# Patient Record
Sex: FEMALE | Race: OTHER | HISPANIC OR LATINO | URBAN - METROPOLITAN AREA
[De-identification: names, ages, dates, MRNs, and addresses within clinical notes are randomized per-mention and may not be internally consistent; named-entity substitution may affect disease eponyms.]

---

## 2023-11-18 ENCOUNTER — EMERGENCY (EMERGENCY)
Facility: HOSPITAL | Age: 33
LOS: 0 days | Discharge: HOME | End: 2023-11-18
Attending: EMERGENCY MEDICINE
Payer: MEDICAID

## 2023-11-18 VITALS
DIASTOLIC BLOOD PRESSURE: 71 MMHG | OXYGEN SATURATION: 98 % | HEIGHT: 61 IN | HEART RATE: 120 BPM | WEIGHT: 137.35 LBS | SYSTOLIC BLOOD PRESSURE: 140 MMHG | RESPIRATION RATE: 18 BRPM | TEMPERATURE: 98 F

## 2023-11-18 VITALS
DIASTOLIC BLOOD PRESSURE: 74 MMHG | OXYGEN SATURATION: 100 % | RESPIRATION RATE: 16 BRPM | TEMPERATURE: 98 F | HEART RATE: 110 BPM | SYSTOLIC BLOOD PRESSURE: 117 MMHG

## 2023-11-18 DIAGNOSIS — N93.9 ABNORMAL UTERINE AND VAGINAL BLEEDING, UNSPECIFIED: ICD-10-CM

## 2023-11-18 DIAGNOSIS — Z87.891 PERSONAL HISTORY OF NICOTINE DEPENDENCE: ICD-10-CM

## 2023-11-18 DIAGNOSIS — N39.0 URINARY TRACT INFECTION, SITE NOT SPECIFIED: ICD-10-CM

## 2023-11-18 LAB
APPEARANCE UR: ABNORMAL
APPEARANCE UR: ABNORMAL
BACTERIA # UR AUTO: ABNORMAL /HPF
BACTERIA # UR AUTO: ABNORMAL /HPF
BILIRUB UR-MCNC: NEGATIVE — SIGNIFICANT CHANGE UP
BILIRUB UR-MCNC: NEGATIVE — SIGNIFICANT CHANGE UP
CAST: 1 /LPF — SIGNIFICANT CHANGE UP (ref 0–4)
CAST: 1 /LPF — SIGNIFICANT CHANGE UP (ref 0–4)
COLOR SPEC: ABNORMAL
COLOR SPEC: ABNORMAL
DIFF PNL FLD: ABNORMAL
DIFF PNL FLD: ABNORMAL
GLUCOSE UR QL: NEGATIVE MG/DL — SIGNIFICANT CHANGE UP
GLUCOSE UR QL: NEGATIVE MG/DL — SIGNIFICANT CHANGE UP
KETONES UR-MCNC: NEGATIVE MG/DL — SIGNIFICANT CHANGE UP
KETONES UR-MCNC: NEGATIVE MG/DL — SIGNIFICANT CHANGE UP
LEUKOCYTE ESTERASE UR-ACNC: ABNORMAL
LEUKOCYTE ESTERASE UR-ACNC: ABNORMAL
NITRITE UR-MCNC: NEGATIVE — SIGNIFICANT CHANGE UP
NITRITE UR-MCNC: NEGATIVE — SIGNIFICANT CHANGE UP
PH UR: 5.5 — SIGNIFICANT CHANGE UP (ref 5–8)
PH UR: 5.5 — SIGNIFICANT CHANGE UP (ref 5–8)
PROT UR-MCNC: 300 MG/DL
PROT UR-MCNC: 300 MG/DL
RBC CASTS # UR COMP ASSIST: >1900 /HPF — HIGH (ref 0–4)
RBC CASTS # UR COMP ASSIST: >1900 /HPF — HIGH (ref 0–4)
SP GR SPEC: 1.02 — SIGNIFICANT CHANGE UP (ref 1–1.03)
SP GR SPEC: 1.02 — SIGNIFICANT CHANGE UP (ref 1–1.03)
SQUAMOUS # UR AUTO: 4 /HPF — SIGNIFICANT CHANGE UP (ref 0–5)
SQUAMOUS # UR AUTO: 4 /HPF — SIGNIFICANT CHANGE UP (ref 0–5)
UROBILINOGEN FLD QL: 1 MG/DL — SIGNIFICANT CHANGE UP (ref 0.2–1)
UROBILINOGEN FLD QL: 1 MG/DL — SIGNIFICANT CHANGE UP (ref 0.2–1)
WBC UR QL: >998 /HPF — HIGH (ref 0–5)
WBC UR QL: >998 /HPF — HIGH (ref 0–5)

## 2023-11-18 PROCEDURE — 99284 EMERGENCY DEPT VISIT MOD MDM: CPT

## 2023-11-18 PROCEDURE — 87077 CULTURE AEROBIC IDENTIFY: CPT

## 2023-11-18 PROCEDURE — 81001 URINALYSIS AUTO W/SCOPE: CPT

## 2023-11-18 PROCEDURE — 99283 EMERGENCY DEPT VISIT LOW MDM: CPT

## 2023-11-18 PROCEDURE — 87186 SC STD MICRODIL/AGAR DIL: CPT

## 2023-11-18 PROCEDURE — 87086 URINE CULTURE/COLONY COUNT: CPT

## 2023-11-18 RX ORDER — ACETAMINOPHEN 500 MG
1000 TABLET ORAL ONCE
Refills: 0 | Status: COMPLETED | OUTPATIENT
Start: 2023-11-18 | End: 2023-11-18

## 2023-11-18 RX ORDER — CEPHALEXIN 500 MG
500 CAPSULE ORAL ONCE
Refills: 0 | Status: COMPLETED | OUTPATIENT
Start: 2023-11-18 | End: 2023-11-18

## 2023-11-18 RX ORDER — FLUCONAZOLE 150 MG/1
150 TABLET ORAL ONCE
Refills: 0 | Status: DISCONTINUED | OUTPATIENT
Start: 2023-11-18 | End: 2023-11-18

## 2023-11-18 RX ORDER — SODIUM CHLORIDE 9 MG/ML
1000 INJECTION INTRAMUSCULAR; INTRAVENOUS; SUBCUTANEOUS ONCE
Refills: 0 | Status: DISCONTINUED | OUTPATIENT
Start: 2023-11-18 | End: 2023-11-18

## 2023-11-18 RX ORDER — PHENAZOPYRIDINE HCL 100 MG
200 TABLET ORAL ONCE
Refills: 0 | Status: COMPLETED | OUTPATIENT
Start: 2023-11-18 | End: 2023-11-18

## 2023-11-18 RX ORDER — PHENAZOPYRIDINE HCL 100 MG
1 TABLET ORAL
Qty: 6 | Refills: 0
Start: 2023-11-18 | End: 2023-11-19

## 2023-11-18 RX ORDER — CEPHALEXIN 500 MG
1 CAPSULE ORAL
Qty: 15 | Refills: 0
Start: 2023-11-18 | End: 2023-11-22

## 2023-11-18 RX ADMIN — Medication 200 MILLIGRAM(S): at 09:52

## 2023-11-18 RX ADMIN — Medication 1000 MILLIGRAM(S): at 07:57

## 2023-11-18 RX ADMIN — Medication 500 MILLIGRAM(S): at 09:52

## 2023-11-18 NOTE — ED PROVIDER NOTE - CLINICAL SUMMARY MEDICAL DECISION MAKING FREE TEXT BOX
33-year-old patient presents emergency department with pain with urination.  Patient with picture showing small amount of blood in toliet bowl/.  Patient's LMP was October 31.  Plan check urine.

## 2023-11-18 NOTE — ED PROVIDER NOTE - PROGRESS NOTE DETAILS
- [id number].  Patient updated on results for test.  Patient also expressed that she has had some white discharge and vaginal itching.  We will start patient on Keflex and will give a dose of Diflucan and will give Pyridium for pain patient.

## 2023-11-18 NOTE — ED PROVIDER NOTE - NSFOLLOWUPINSTRUCTIONS_ED_ALL_ED_FT
Please call and follow up with your doctor in 1-3 days.    Use Tylenol (acetaminophen) 1000mg every 6 hours and Motrin (Ibuprofen) 600 mg every 6 hours as need for pain.      Take Keflex 500 mg every 8 hours for the next 5 days.     use Pyridium 200 mg every 8 hours as needed for pain with urination for the next 2 days..  The    Return to the Emergency Department for worsening or persistent symptoms, and/or ANY NEW OR CONCERNING SYMPTOMS. If you have issues obtaining follow up, please call: [phone] or [phone]  to obtain a doctor or specialist who takes your insurance in your area.    Llame y marlyn un seguimiento con lloyd médico en 1 a 3 días.    Use Tylenol (acetaminofeno) 1000 mg cada 6 horas y Motrin (ibuprofeno) 600 mg cada 6 horas según sea necesario para el dolor.      [place] Keflex 500 mg cada 8 horas johana los próximos 5 días.     use Pyridium 200 mg cada 8 horas según sea necesario para el dolor al orinar johana los próximos 2 días.    Regrese al Departamento de Emergencias si los síntomas empeoran o persisten, y/o CUALQUIER SÍNTOMA NUEVO O PREOCUPANTE. Si tiene problemas para obtener un seguimiento, llame al: [phone] o al [phone] para obtener un médico o especialista que acepte lloyd seguro en lloyd área.      Infección del tracto urinario en mujeres    LO QUE NECESITA SABER:    Juwan infección en el tracto urinario (ITU) ocurre cuando entran bacterias en lloyd tracto urinario. Lloyd tracto urinario incluye rani riñones, uréteres, vejiga y uretra. Juwan infección del tracto urinario es más común in la parte inferior de lloyd tracto urinario que incluye lloyd vejiga y uretra.  Aparato urinario femenino    INSTRUCCIONES SOBRE EL [facility]:    Busque atención médica de inmediato si:    Usted está orinando muy poco o nada en absoluto.    Usted tiene fiebre chester con temblor y escalofríos.    Usted tiene dolor en el costado o en la espalda que empeora.  Llame a lloyd médico si:    Tiene fiebre.    Usted no siente mejoría después de 2 días de sierra los antibióticos.    Usted tiene síntomas nuevos, tales ashanti cliff o pus en la orina.    Usted está vomitando.    Usted tiene preguntas o inquietudes acerca de lloyd condición o cuidado.  Medicamentos:    Los antibióticostratan las infecciones bacterianas. Si tiene infecciones urinarias con frecuencia (llamadas recurrentes), se le pueden chilo antibióticos para que los tome regularmente. Le enseñarán cuándo y cómo usar los antibióticos. El objetivo es prevenir las infecciones urinarias, venancio no causar resistencia a los antibióticos mediante el uso de antibióticos con demasiada frecuencia.    Los medicamentospara disminuir el dolor y el ardor al orinar. También ayudarán a disminuir la sensación de necesitar orinar con frecuencia. Estos medicamentos podrían hacer que orine de color anaranjado o coombs.    [place] rani medicamentos ashanti se le haya indicado.Consulte con lloyd médico si usted richard que lloyd medicamento no le está ayudando o si presenta efectos secundarios. Infórmele al médico si usted es alérgico a algún medicamento. Mantenga juwan lista actualizada de los medicamentos, las vitaminas y los productos herbales que lauro. Incluya los siguientes datos de los medicamentos: cantidad, frecuencia y motivo de administración. Traiga con usted la lista o los envases de las píldoras a rani citas de seguimiento. Lleve la lista de los medicamentos con usted en lalit de juwan emergencia.  Acuda a la consulta de control con lloyd médico según las indicaciones:Anote rani preguntas para que se acuerde de hacerlas johana rani visitas.    Evite otra ITU:    Vacíe la vejiga con frecuencia.Orine y vacíe la vejiga tan pronto ashanti usted sienta la necesidad. No retenga la orina por largos períodos de tiempo.    Límpiese de adelante hacia atrás después de orinar o de tener juwan evacuación intestinal.Nectar ayudará a evitar que los gérmenes entren en el tracto urinario a través de la uretra.    [place] líquidos ashanti se le haya indicado.Pregunte cuánto líquido debe sierra cada día y cuáles líquidos son los más adecuados para usted. Es probable que usted necesite sierra más líquidos de lo habitual para ayudar a deshacerse de la bacteria. No tome alcohol, cafeína ni jugos cítricos. Estos pueden irritar lloyd vejiga y aumentar rani síntomas. Lloyd médico puede recomendarle el jugo de arándano para prevenir juwan infección urinaria.    Orine antes y después de tener relaciones sexuales.Nectar puede ayudar a eliminar las bacterias que pasan johana el sexo.    No tome duchas vaginales ni use desodorantes femeninos.Estos pueden cambiar el equilibrio químico de la vagina.    Cambie las compresas o los tampones a menudo.Nectar ayudará a evitar que los gérmenes entren en el tracto urinario.    Consulte con lloyd médico sobre los métodos anticonceptivos.Es posible que tenga que cambiar lloyd método si está aumentando lloyd riesgo de infecciones urinarias.    Use ropa interior de algodón y ropa suelta.Los pantalones ajustados y la ropa interior de nylon pueden atrapar humedad y hacer que las bacterias crezcan.    Se puede recomendar el uso de estrógeno vaginal.Della medicamento ayuda a prevenir las infecciones urinarias en mujeres que cheema pasado por la menopausia o que están en la perimenopausia.    Realice ejercicios para los músculos pélvicos con frecuencia.Los ejercicios para los músculos pélvicos ayudan a empezar y parar de orinar. Los músculos pélvicos teresa ayudan a vaciar la vejiga más fácilmente. Apriete estos músculos firmemente johana 5 segundos ashanti si estuviera tratando de retener el flujo de orina. Luego relaje por 5 segundos. Aumente gradualmente a 10 segundos. Marlyn 3 series de 15 repeticiones al día o ashanti se le indique.

## 2023-11-18 NOTE — ED ADULT TRIAGE NOTE - CHIEF COMPLAINT QUOTE
pt complaining of vaginal pain x1 day with bleeding starting today.  pt denies pregnancy or this being her period.  no medications PTA.  pt has urinary frequency with minimal output with burning with urination.

## 2023-11-18 NOTE — ED PROVIDER NOTE - CHIEF COMPLAINT
The patient is a 33y Female complaining of vaginal bleeding.
preop diagnosis colon cancer hepatic flexure s/p partial colectomy

## 2023-11-18 NOTE — ED PROVIDER NOTE - PATIENT PORTAL LINK FT
You can access the FollowMyHealth Patient Portal offered by [facility] by registering at the following website: [email or web address]. By joining [facility]’s FollowMyHealth portal, you will also be able to view your health information using other applications (apps) compatible with our system.

## 2023-11-18 NOTE — ED ADULT NURSE NOTE - OBJECTIVE STATEMENT
Patient presents to ED c/o pink tinged urine x 1 day and abdominal pain. Denies fever/chills. Pt endorses urinary frequency with small amounts of urine and dysuria. Denies being pregnant.

## 2023-11-18 NOTE — ED PROVIDER NOTE - NSDCPRINTRESULTS_ED_ALL_ED
<<----- Click to add NO significant Past Surgical History
Patient requests all Lab, Cardiology, and Radiology Results on their Discharge Instructions

## 2023-11-18 NOTE — ED PROVIDER NOTE - OBJECTIVE STATEMENT
Azeri - [id number]  used   33-year-old patient lives smoker history presents emergency department with pain.  Patient stated symptoms started yesterday and this morning the pain was worse and came to the hospital.  Denies any fever.  Did notice some blood in her urine this morning.  No travel, no sick contacts, no new sexual partners.  No history of STIs.   patient has had the symptoms before however did not seek medical attention.

## 2023-11-21 LAB
-  AMOXICILLIN/CLAVULANIC ACID: SIGNIFICANT CHANGE UP
-  AMOXICILLIN/CLAVULANIC ACID: SIGNIFICANT CHANGE UP
-  AMPICILLIN/SULBACTAM: SIGNIFICANT CHANGE UP
-  AMPICILLIN/SULBACTAM: SIGNIFICANT CHANGE UP
-  AMPICILLIN: SIGNIFICANT CHANGE UP
-  AMPICILLIN: SIGNIFICANT CHANGE UP
-  AZTREONAM: SIGNIFICANT CHANGE UP
-  AZTREONAM: SIGNIFICANT CHANGE UP
-  CEFAZOLIN: SIGNIFICANT CHANGE UP
-  CEFAZOLIN: SIGNIFICANT CHANGE UP
-  CEFEPIME: SIGNIFICANT CHANGE UP
-  CEFEPIME: SIGNIFICANT CHANGE UP
-  CEFOTAXIME: SIGNIFICANT CHANGE UP
-  CEFOTAXIME: SIGNIFICANT CHANGE UP
-  CEFOXITIN: SIGNIFICANT CHANGE UP
-  CEFOXITIN: SIGNIFICANT CHANGE UP
-  CEFTAZIDIME: SIGNIFICANT CHANGE UP
-  CEFTAZIDIME: SIGNIFICANT CHANGE UP
-  CEFTRIAXONE: SIGNIFICANT CHANGE UP
-  CEFTRIAXONE: SIGNIFICANT CHANGE UP
-  CIPROFLOXACIN: SIGNIFICANT CHANGE UP
-  CIPROFLOXACIN: SIGNIFICANT CHANGE UP
-  ERTAPENEM: SIGNIFICANT CHANGE UP
-  ERTAPENEM: SIGNIFICANT CHANGE UP
-  GENTAMICIN: SIGNIFICANT CHANGE UP
-  GENTAMICIN: SIGNIFICANT CHANGE UP
-  IMIPENEM: SIGNIFICANT CHANGE UP
-  IMIPENEM: SIGNIFICANT CHANGE UP
-  LEVOFLOXACIN: SIGNIFICANT CHANGE UP
-  LEVOFLOXACIN: SIGNIFICANT CHANGE UP
-  MEROPENEM: SIGNIFICANT CHANGE UP
-  MEROPENEM: SIGNIFICANT CHANGE UP
-  NITROFURANTOIN: SIGNIFICANT CHANGE UP
-  NITROFURANTOIN: SIGNIFICANT CHANGE UP
-  PIPERACILLIN/TAZOBACTAM: SIGNIFICANT CHANGE UP
-  PIPERACILLIN/TAZOBACTAM: SIGNIFICANT CHANGE UP
-  TOBRAMYCIN: SIGNIFICANT CHANGE UP
-  TOBRAMYCIN: SIGNIFICANT CHANGE UP
-  TRIMETHOPRIM/SULFAMETHOXAZOLE: SIGNIFICANT CHANGE UP
-  TRIMETHOPRIM/SULFAMETHOXAZOLE: SIGNIFICANT CHANGE UP
CULTURE RESULTS: ABNORMAL
CULTURE RESULTS: ABNORMAL
METHOD TYPE: SIGNIFICANT CHANGE UP
METHOD TYPE: SIGNIFICANT CHANGE UP
ORGANISM # SPEC MICROSCOPIC CNT: ABNORMAL
ORGANISM # SPEC MICROSCOPIC CNT: ABNORMAL
ORGANISM # SPEC MICROSCOPIC CNT: SIGNIFICANT CHANGE UP
ORGANISM # SPEC MICROSCOPIC CNT: SIGNIFICANT CHANGE UP
SPECIMEN SOURCE: SIGNIFICANT CHANGE UP
SPECIMEN SOURCE: SIGNIFICANT CHANGE UP

## 2025-07-04 ENCOUNTER — INPATIENT (INPATIENT)
Facility: HOSPITAL | Age: 35
LOS: 2 days | Discharge: HOME | DRG: 392 | End: 2025-07-07
Attending: INTERNAL MEDICINE | Admitting: INTERNAL MEDICINE
Payer: SELF-PAY

## 2025-07-04 VITALS
RESPIRATION RATE: 18 BRPM | SYSTOLIC BLOOD PRESSURE: 115 MMHG | TEMPERATURE: 99 F | HEIGHT: 62 IN | WEIGHT: 127.87 LBS | DIASTOLIC BLOOD PRESSURE: 78 MMHG | OXYGEN SATURATION: 99 % | HEART RATE: 105 BPM

## 2025-07-04 DIAGNOSIS — E87.6 HYPOKALEMIA: ICD-10-CM

## 2025-07-04 DIAGNOSIS — Z29.9 ENCOUNTER FOR PROPHYLACTIC MEASURES, UNSPECIFIED: ICD-10-CM

## 2025-07-04 DIAGNOSIS — D50.9 IRON DEFICIENCY ANEMIA, UNSPECIFIED: ICD-10-CM

## 2025-07-04 DIAGNOSIS — D75.839 THROMBOCYTOSIS, UNSPECIFIED: ICD-10-CM

## 2025-07-04 DIAGNOSIS — K52.9 NONINFECTIVE GASTROENTERITIS AND COLITIS, UNSPECIFIED: ICD-10-CM

## 2025-07-04 DIAGNOSIS — R74.8 ABNORMAL LEVELS OF OTHER SERUM ENZYMES: ICD-10-CM

## 2025-07-04 DIAGNOSIS — A41.9 SEPSIS, UNSPECIFIED ORGANISM: ICD-10-CM

## 2025-07-04 LAB
ALBUMIN SERPL ELPH-MCNC: 3.2 G/DL — LOW (ref 3.3–5)
ALP SERPL-CCNC: 137 U/L — HIGH (ref 30–120)
ALT FLD-CCNC: 35 U/L — SIGNIFICANT CHANGE UP (ref 10–60)
ANION GAP SERPL CALC-SCNC: 12 MMOL/L — SIGNIFICANT CHANGE UP (ref 5–17)
APPEARANCE UR: CLEAR — SIGNIFICANT CHANGE UP
APTT BLD: 24.1 SEC — LOW (ref 26.1–36.8)
AST SERPL-CCNC: 29 U/L — SIGNIFICANT CHANGE UP (ref 10–40)
BASOPHILS # BLD AUTO: 0.06 K/UL — SIGNIFICANT CHANGE UP (ref 0–0.2)
BASOPHILS # BLD MANUAL: 0 K/UL — SIGNIFICANT CHANGE UP (ref 0–0.2)
BASOPHILS NFR BLD AUTO: 0.4 % — SIGNIFICANT CHANGE UP (ref 0–2)
BASOPHILS NFR BLD MANUAL: 0 % — SIGNIFICANT CHANGE UP (ref 0–2)
BILIRUB SERPL-MCNC: 0.2 MG/DL — SIGNIFICANT CHANGE UP (ref 0.2–1.2)
BILIRUB UR-MCNC: NEGATIVE — SIGNIFICANT CHANGE UP
BUN SERPL-MCNC: 10 MG/DL — SIGNIFICANT CHANGE UP (ref 7–23)
CALCIUM SERPL-MCNC: 9 MG/DL — SIGNIFICANT CHANGE UP (ref 8.4–10.5)
CHLORIDE SERPL-SCNC: 100 MMOL/L — SIGNIFICANT CHANGE UP (ref 96–108)
CO2 SERPL-SCNC: 25 MMOL/L — SIGNIFICANT CHANGE UP (ref 22–31)
COLOR SPEC: ABNORMAL
CREAT SERPL-MCNC: 0.87 MG/DL — SIGNIFICANT CHANGE UP (ref 0.5–1.3)
DIFF PNL FLD: NEGATIVE — SIGNIFICANT CHANGE UP
EGFR: 88 ML/MIN/1.73M2 — SIGNIFICANT CHANGE UP
EGFR: 88 ML/MIN/1.73M2 — SIGNIFICANT CHANGE UP
EOSINOPHIL # BLD AUTO: 0.41 K/UL — SIGNIFICANT CHANGE UP (ref 0–0.5)
EOSINOPHIL # BLD MANUAL: 0.26 K/UL — SIGNIFICANT CHANGE UP (ref 0–0.5)
EOSINOPHIL NFR BLD AUTO: 2.6 % — SIGNIFICANT CHANGE UP (ref 0–6)
EOSINOPHIL NFR BLD MANUAL: 1.7 % — SIGNIFICANT CHANGE UP (ref 0–6)
GIANT PLATELETS BLD QL SMEAR: PRESENT
GLUCOSE SERPL-MCNC: 129 MG/DL — HIGH (ref 70–99)
GLUCOSE UR QL: NEGATIVE MG/DL — SIGNIFICANT CHANGE UP
HCG UR QL: NEGATIVE — SIGNIFICANT CHANGE UP
HCT VFR BLD CALC: 27.2 % — LOW (ref 34.5–45)
HGB BLD-MCNC: 8.1 G/DL — LOW (ref 11.5–15.5)
IMM GRANULOCYTES # BLD AUTO: 0.06 K/UL — SIGNIFICANT CHANGE UP (ref 0–0.07)
IMM GRANULOCYTES NFR BLD AUTO: 0.4 % — SIGNIFICANT CHANGE UP (ref 0–0.9)
INR BLD: 1.17 RATIO — HIGH (ref 0.85–1.16)
KETONES UR QL: NEGATIVE MG/DL — SIGNIFICANT CHANGE UP
LEUKOCYTE ESTERASE UR-ACNC: ABNORMAL
LIDOCAIN IGE QN: 197 U/L — HIGH (ref 16–77)
LYMPHOCYTES # BLD AUTO: 3.19 K/UL — SIGNIFICANT CHANGE UP (ref 1–3.3)
LYMPHOCYTES # BLD MANUAL: 3.66 K/UL — HIGH (ref 1–3.3)
LYMPHOCYTES NFR BLD AUTO: 20.5 % — SIGNIFICANT CHANGE UP (ref 13–44)
LYMPHOCYTES NFR BLD MANUAL: 23.5 % — SIGNIFICANT CHANGE UP (ref 13–44)
MAGNESIUM SERPL-MCNC: 2.4 MG/DL — SIGNIFICANT CHANGE UP (ref 1.6–2.6)
MANUAL NEUTROPHIL BANDS #: 0.14 K/UL — SIGNIFICANT CHANGE UP (ref 0–0.84)
MANUAL REACTIVE LYMPHOCYTES #: 0.14 K/UL — SIGNIFICANT CHANGE UP (ref 0–0.63)
MCHC RBC-ENTMCNC: 19 PG — LOW (ref 27–34)
MCHC RBC-ENTMCNC: 29.8 G/DL — LOW (ref 32–36)
MCV RBC AUTO: 63.8 FL — LOW (ref 80–100)
MONOCYTES # BLD AUTO: 0.87 K/UL — SIGNIFICANT CHANGE UP (ref 0–0.9)
MONOCYTES # BLD MANUAL: 0.14 K/UL — SIGNIFICANT CHANGE UP (ref 0–0.9)
MONOCYTES NFR BLD AUTO: 5.6 % — SIGNIFICANT CHANGE UP (ref 2–14)
MONOCYTES NFR BLD MANUAL: 0.9 % — LOW (ref 2–14)
NEUTROPHILS # BLD AUTO: 10.99 K/UL — HIGH (ref 1.8–7.4)
NEUTROPHILS # BLD MANUAL: 11.23 K/UL — HIGH (ref 1.8–7.4)
NEUTROPHILS NFR BLD AUTO: 70.5 % — SIGNIFICANT CHANGE UP (ref 43–77)
NEUTROPHILS NFR BLD MANUAL: 72.1 % — SIGNIFICANT CHANGE UP (ref 43–77)
NEUTS BAND # BLD: 0.9 % — SIGNIFICANT CHANGE UP (ref 0–8)
NEUTS BAND NFR BLD: 0.9 % — SIGNIFICANT CHANGE UP (ref 0–8)
NITRITE UR-MCNC: POSITIVE
NRBC # BLD AUTO: 0 K/UL — SIGNIFICANT CHANGE UP (ref 0–0)
NRBC # FLD: 0 K/UL — SIGNIFICANT CHANGE UP (ref 0–0)
NRBC BLD AUTO-RTO: 0 /100 WBCS — SIGNIFICANT CHANGE UP (ref 0–0)
OB PNL STL: NEGATIVE — SIGNIFICANT CHANGE UP
PH UR: 5 — SIGNIFICANT CHANGE UP (ref 5–8)
PLAT MORPH BLD: NORMAL — SIGNIFICANT CHANGE UP
PLATELET # BLD AUTO: 432 K/UL — HIGH (ref 150–400)
PLATELET COUNT - ESTIMATE: NORMAL — SIGNIFICANT CHANGE UP
PMV BLD: 8.8 FL — SIGNIFICANT CHANGE UP (ref 7–13)
POTASSIUM SERPL-MCNC: 3.2 MMOL/L — LOW (ref 3.5–5.3)
POTASSIUM SERPL-SCNC: 3.2 MMOL/L — LOW (ref 3.5–5.3)
PROT SERPL-MCNC: 8.1 G/DL — SIGNIFICANT CHANGE UP (ref 6–8.3)
PROT UR-MCNC: SIGNIFICANT CHANGE UP MG/DL
PROTHROM AB SERPL-ACNC: 13.5 SEC — HIGH (ref 9.9–13.4)
RBC # BLD: 4.26 M/UL — SIGNIFICANT CHANGE UP (ref 3.8–5.2)
RBC # FLD: 19.2 % — HIGH (ref 10.3–14.5)
RBC BLD AUTO: NORMAL — SIGNIFICANT CHANGE UP
SMUDGE CELLS # BLD: PRESENT
SODIUM SERPL-SCNC: 137 MMOL/L — SIGNIFICANT CHANGE UP (ref 135–145)
SP GR SPEC: 1.02 — SIGNIFICANT CHANGE UP (ref 1–1.03)
UROBILINOGEN FLD QL: 0.2 MG/DL — SIGNIFICANT CHANGE UP (ref 0.2–1)
VARIANT LYMPHS # BLD: 0.9 % — SIGNIFICANT CHANGE UP (ref 0–6)
VARIANT LYMPHS NFR BLD MANUAL: 0.9 % — SIGNIFICANT CHANGE UP (ref 0–6)
WBC # BLD: 15.58 K/UL — HIGH (ref 3.8–10.5)
WBC # FLD AUTO: 15.58 K/UL — HIGH (ref 3.8–10.5)

## 2025-07-04 PROCEDURE — 74177 CT ABD & PELVIS W/CONTRAST: CPT

## 2025-07-04 PROCEDURE — 36415 COLL VENOUS BLD VENIPUNCTURE: CPT

## 2025-07-04 PROCEDURE — 82272 OCCULT BLD FECES 1-3 TESTS: CPT

## 2025-07-04 PROCEDURE — 86900 BLOOD TYPING SEROLOGIC ABO: CPT

## 2025-07-04 PROCEDURE — 81001 URINALYSIS AUTO W/SCOPE: CPT

## 2025-07-04 PROCEDURE — 83690 ASSAY OF LIPASE: CPT

## 2025-07-04 PROCEDURE — 86901 BLOOD TYPING SEROLOGIC RH(D): CPT

## 2025-07-04 PROCEDURE — 85730 THROMBOPLASTIN TIME PARTIAL: CPT

## 2025-07-04 PROCEDURE — 85610 PROTHROMBIN TIME: CPT

## 2025-07-04 PROCEDURE — 71045 X-RAY EXAM CHEST 1 VIEW: CPT

## 2025-07-04 PROCEDURE — 93010 ELECTROCARDIOGRAM REPORT: CPT

## 2025-07-04 PROCEDURE — 86850 RBC ANTIBODY SCREEN: CPT

## 2025-07-04 PROCEDURE — 99285 EMERGENCY DEPT VISIT HI MDM: CPT

## 2025-07-04 PROCEDURE — 81025 URINE PREGNANCY TEST: CPT

## 2025-07-04 PROCEDURE — 85025 COMPLETE CBC W/AUTO DIFF WBC: CPT

## 2025-07-04 PROCEDURE — 80053 COMPREHEN METABOLIC PANEL: CPT

## 2025-07-04 PROCEDURE — 83735 ASSAY OF MAGNESIUM: CPT

## 2025-07-04 PROCEDURE — 99223 1ST HOSP IP/OBS HIGH 75: CPT

## 2025-07-04 PROCEDURE — 71045 X-RAY EXAM CHEST 1 VIEW: CPT | Mod: 26

## 2025-07-04 PROCEDURE — 74177 CT ABD & PELVIS W/CONTRAST: CPT | Mod: 26

## 2025-07-04 RX ORDER — ACETAMINOPHEN 500 MG/5ML
1000 LIQUID (ML) ORAL ONCE
Refills: 0 | Status: COMPLETED | OUTPATIENT
Start: 2025-07-04 | End: 2025-07-04

## 2025-07-04 RX ORDER — AZITHROMYCIN 250 MG
CAPSULE ORAL
Refills: 0 | Status: DISCONTINUED | OUTPATIENT
Start: 2025-07-04 | End: 2025-07-07

## 2025-07-04 RX ORDER — SODIUM CHLORIDE 9 G/1000ML
1000 INJECTION, SOLUTION INTRAVENOUS
Refills: 0 | Status: DISCONTINUED | OUTPATIENT
Start: 2025-07-04 | End: 2025-07-06

## 2025-07-04 RX ORDER — AZITHROMYCIN 250 MG
500 CAPSULE ORAL ONCE
Refills: 0 | Status: COMPLETED | OUTPATIENT
Start: 2025-07-04 | End: 2025-07-04

## 2025-07-04 RX ORDER — PIPERACILLIN-TAZO-DEXTROSE,ISO 3.375G/5
3.38 IV SOLUTION, PIGGYBACK PREMIX FROZEN(ML) INTRAVENOUS ONCE
Refills: 0 | Status: COMPLETED | OUTPATIENT
Start: 2025-07-04 | End: 2025-07-04

## 2025-07-04 RX ORDER — ACETAMINOPHEN 500 MG/5ML
650 LIQUID (ML) ORAL EVERY 6 HOURS
Refills: 0 | Status: DISCONTINUED | OUTPATIENT
Start: 2025-07-04 | End: 2025-07-07

## 2025-07-04 RX ORDER — PREDNISONE 20 MG/1
40 TABLET ORAL AT BEDTIME
Refills: 0 | Status: DISCONTINUED | OUTPATIENT
Start: 2025-07-04 | End: 2025-07-06

## 2025-07-04 RX ORDER — AZITHROMYCIN 250 MG
500 CAPSULE ORAL EVERY 24 HOURS
Refills: 0 | Status: DISCONTINUED | OUTPATIENT
Start: 2025-07-05 | End: 2025-07-07

## 2025-07-04 RX ADMIN — Medication 3.38 GRAM(S): at 20:46

## 2025-07-04 RX ADMIN — Medication 1000 MILLIGRAM(S): at 20:15

## 2025-07-04 RX ADMIN — Medication 250 MILLIGRAM(S): at 22:57

## 2025-07-04 RX ADMIN — Medication 200 GRAM(S): at 20:16

## 2025-07-04 RX ADMIN — Medication 40 MILLIEQUIVALENT(S): at 20:50

## 2025-07-04 RX ADMIN — SODIUM CHLORIDE 150 MILLILITER(S): 9 INJECTION, SOLUTION INTRAVENOUS at 22:57

## 2025-07-04 RX ADMIN — Medication 400 MILLIGRAM(S): at 19:30

## 2025-07-04 RX ADMIN — Medication 1000 MILLILITER(S): at 19:30

## 2025-07-04 RX ADMIN — PREDNISONE 40 MILLIGRAM(S): 20 TABLET ORAL at 22:58

## 2025-07-04 RX ADMIN — Medication 1000 MILLIGRAM(S): at 19:45

## 2025-07-04 RX ADMIN — Medication 1000 MILLILITER(S): at 18:43

## 2025-07-04 RX ADMIN — Medication 20 MILLIGRAM(S): at 19:31

## 2025-07-04 NOTE — ED ADULT NURSE NOTE - OBJECTIVE STATEMENT
34 yo F no pmh ambulated to ED c/o intermittent periumbilical abdominal pain x 8 days with associated diarrhea.  The diarrhea has resolved but the pain persists.  Pt endorses having similar pain in the past, but not as extreme.  Denies CP, back pain, SOB, fevers/chills, n/v, lightheadedness, dizziness, changes in urinary habits.  A&Ox4, abdomen soft, nondistended, tender to palpation.  Skin w/d/i.

## 2025-07-04 NOTE — H&P ADULT - NSHPADDITIONALINFOADULT_GEN_ALL_CORE
Management plan was discussed with patient at the bedside using the free translation service line, she understands & agrees.

## 2025-07-04 NOTE — H&P ADULT - PROBLEM SELECTOR PLAN 1
Terminal Ileitis as suggested by CT with RLQ mesenteric LN enlargement, possibly infectious, r/o Crohn's 1st episode, admitted to medicine, tested negative for FOB, contact precautions pending GI- PCR & stool culture results, pain control, IVF hydration, I & O, received one dose of Zosyn earlier by ED team, changed to Azithromycin 500 mg IVPB daily 1st dose given stat , also started her empirically on Prednisone 40 mg at bedtime 1st dose stat, stool for Calprotectin, GI consult with Dr. Zhang was called earlier by ED team, called ID consult with Dr. Kelley.

## 2025-07-04 NOTE — ED PROVIDER NOTE - DIFFERENTIAL DIAGNOSIS
Differential Diagnosis Patient's presenting to the emergency room with diffuse abdominal pain and diarrhea.  Differential includes colitis versus diverticulitis versus additional intra-abdominal pathology.  Will obtain screening labs CT imaging hydrate medicate for symptoms begin antibiotics as needed obtain stool studies if able to do monitor.  Patient denies any recent antibiotic use.  Ultimate clinical disposition will be pending results.

## 2025-07-04 NOTE — H&P ADULT - NSHPLABSRESULTS_GEN_ALL_CORE
-                          8.1    15.58 )-----------( 432      ( 2025 18:41 )             27.2       2025 18:41    137    |  100    |  10     ----------------------------<  129    3.2     |  25     |  0.87     Ca    9.0        2025 18:41  Mg     2.4       2025 18:41    TPro  8.1    /  Alb  3.2    /  TBili  0.2    /  DBili  x      /  AST  29     /  ALT  35     /  AlkPhos  137    2025 18:41    LIVER FUNCTIONS - ( 2025 18:41 )  Alb: 3.2 g/dL / Pro: 8.1 g/dL / ALK PHOS: 137 U/L / ALT: 35 U/L / AST: 29 U/L / GGT: x           PT/INR - ( 2025 19:10 )   PT: 13.5 sec;   INR: 1.17 ratio    PTT - ( 2025 19:10 )  PTT:24.1 sec    Urinalysis Basic - ( 2025 18:41 )  Color: Red / Appearance: Clear / S.016 / pH: x  Gluc: 129 mg/dL / Ketone: x  / Bili: Negative / Urobili: 0.2 mg/dL   Blood: x / Protein: Trace mg/dL / Nitrite: Positive   Leuk Esterase: Small / RBC: 1 /HPF / WBC 4 /HPF   Sq Epi: x / Non Sq Epi: x / Bacteria: Few /HPF    Pregnancy Profile, Urine (25 @ 18:41)   Pregnancy Profile, Urine: Negative.    Occult Blood, Feces (25 @ 19:00)   Occult Blood, Feces: Negative: Method: Peroxidase Catalyzation Activity    Lipase (25 @ 18:41)   Lipase: 197 U/L      CT ABDOMEN AND PELVIS IC    PROCEDURE DATE:  2025    INTERPRETATION:  CLINICAL INFORMATION: Stomach pain. Diarrhea.  COMPARISON: None.  CONTRAST/COMPLICATIONS:  IV Contrast: 80 cc administered   20 cc discarded  Oral Contrast: None  PROCEDURE:  CT of the Abdomen and Pelvis was performed.  Sagittal and coronal reformats were performed.  FINDINGS:  LOWER CHEST: Within normal limits.  LIVER: Within normal limits.  BILE DUCTS: Normal caliber.  GALLBLADDER: Within normal limits.  SPLEEN: Within normal limits.  PANCREAS: Within normal limits.  ADRENALS: Within normal limits.  KIDNEYS/URETERS: Within normal limits.  BLADDER: Within normal limits.  REPRODUCTIVE ORGANS: Uterus and adnexa within normal limits.  BOWEL: No bowel obstruction. Appendix is normal. There is wall thickening and increased mucosal enhancement of the terminal ileum measuring about 10 cm in length.  PERITONEUM/RETROPERITONEUM: No ascites or free air..  VESSELS: Within normal limits.  LYMPH NODES: Mildly enlarged right lower quadrant mesenteric lymph nodes.   No retroperitoneal lymphadenopathy.  ABDOMINAL WALL: Within normal limits.  BONES: Within normal limits.  IMPRESSION:  Findings consistent with ileitis of infectious or inflammatory change.      XR CHEST AP OR PA 1V     PROCEDURE DATE:  2025    INTERPRETATION:  TECHNIQUE: A single AP view of the chest was obtained.   Ordered time:   2025 8:39 PM  COMPARISON: None  CLINICAL INFORMATION: Abdominal pain  FINDINGS:  The heart is not well assessed on an AP film.  The lungs are clear.  There are no pleural effusions.  There is no pneumothorax.  IMPRESSION:  No radiographic evidence of acute cardiopulmonary disease  Personally reviewed by me.      EKG:    As per my review shows SR at 85/min, normal MD & QTc intervals, normal QRS voltage, duration, and axis (+45), with normal transition, no ST-T abnormality.    -

## 2025-07-04 NOTE — H&P ADULT - PROBLEM SELECTOR PLAN 5
ML iron deficiency anemia, may be related to ileal absorption of iron, no reported bleeding from anywhere, negative stool for FOB, check serum iron level, serum ferritin, and TIBC.

## 2025-07-04 NOTE — ED ADULT TRIAGE NOTE - BMI (KG/M2)
[Care Plan reviewed and provided to patient/caregiver] : Care plan reviewed and provided to patient/caregiver
23.4

## 2025-07-04 NOTE — H&P ADULT - HISTORY OF PRESENT ILLNESS
This is a 34 y/o F with no significant PMH who presented with diffuse crampy abdominal pain, mostly around the umbilicus & RLQ, started 8 days ago, and watery non bloody diarrhea that started the following day, about 5 times a day, last was yesterday am, also reports having a single episode of vomiting 2 days ago, vomitus was unremarkable, no fever or chills, no household with similar symptoms, denies any deli or undercooked/ raw meat consumption, no H/O of similar episodes in the past, and no FH of IBD.

## 2025-07-04 NOTE — H&P ADULT - ASSESSMENT
34 y/o F with no significant PMH presented with abdominal pain, diarrhea, and a single episode of vomiting.

## 2025-07-04 NOTE — ED PROVIDER NOTE - TELEPHONIC ID NUMBER OF THE INTERPRETER
For information on Fall & Injury Prevention, visit [email or web address]

## 2025-07-04 NOTE — H&P ADULT - PROBLEM SELECTOR PLAN 3
ML 2ry to GI loss, no related EKG abnormality, started her on telemetry, patient received 40 meq of potassium chloride PO X1 dose earlier, gave additional 3 doses of potassium chloride IVPB, check serum potassium level in am, also recheck serum magnesium level.

## 2025-07-04 NOTE — ED PROVIDER NOTE - OBJECTIVE STATEMENT
DISPLAY PLAN FREE TEXT 35-year-old female with no significant past medical history presents with complaint of abdominal pain x 8 days.  Patient states that she started having abdominal pain 8 days ago and had nonbloody diarrhea for the past 7 days which subsided yesterday.  States that she had 1 episode of vomiting two days ago.  LMP-6/13.  Denies history of abdominal surgeries, fever, dysuria/hematuria, blood in stool, hematemesis, vaginal bleeding, back pain, chest pain, difficulty breathing, dizziness, recent travel, sick contacts or other symptoms.

## 2025-07-04 NOTE — ED PROVIDER NOTE - CARE PLAN
1 Principal Discharge DX:	Ileitis  Secondary Diagnosis:	Anemia  Secondary Diagnosis:	Elevated lipase

## 2025-07-04 NOTE — ED ADULT TRIAGE NOTE - CHIEF COMPLAINT QUOTE
patient c/o of abdominal pain for 8 days , c/o of vomiting , c/o of diarrhea for 7 days , no blood in stool or urine, no dysuria ,

## 2025-07-04 NOTE — H&P ADULT - NSHPREVIEWOFSYSTEMS_GEN_ALL_CORE
-    CONSTITUTIONAL: No fever or chills.  EYES: No eye pain, visual disturbances, or discharge.  ENMT:  No difficulty hearing, vertigo, sinus or throat pain.  NECK: No pain or stiffness.	  RESPIRATORY: No cough, wheezing, or hemoptysis; No shortness of breath.  CARDIOVASCULAR: No chest pain, palpitations, dizziness, or leg swelling.  GASTROINTESTINAL: (+) abdominal pain, nausea, vomiting, and diarrhea, no hematemesis; no melena or hematochezia.  GENITOURINARY: No dysuria, frequency, hematuria, or incontinence.  NEUROLOGICAL: No headaches, focal muscle weakness, numbness, or tremors.  MUSCULOSKELETAL: No joint swelling or pain.  PSYCHIATRIC: No depression, anxiety, or agitation.

## 2025-07-04 NOTE — ED PROVIDER NOTE - CLINICAL SUMMARY MEDICAL DECISION MAKING FREE TEXT BOX
Patient is a 35-year-old female presents to the emergency room with a chief complaint of abdominal pain.  Patient with no significant past medical history.  Reports that she has been experiencing diffuse abdominal pain for the last 8 days and for the last 7 days she has had multiple episodes of nonbloody diarrhea although the diarrhea has improved since yesterday.  She does note 1 episode of vomiting 2 days ago but has had continued nausea.  Denies any fever chest pain or shortness of breath.  Again no known blood noted in stool.  On exam patient is lying in bed no acute distress normocephalic atraumatic pupils equal round and reactive hearts regular rate lungs clear to auscultation abdomen is soft with diffuse tenderness to palpation no significant the right upper quadrant and right lower quadrant positive bowel sounds noted no skin rashes noted neuroexam is nonfocal.  Patient's presenting to the emergency room with diffuse abdominal pain and diarrhea.  Differential includes colitis versus diverticulitis versus additional intra-abdominal pathology.  Will obtain screening labs CT imaging hydrate medicate for symptoms begin antibiotics as needed obtain stool studies if able to do monitor.  Patient denies any recent antibiotic use.  Ultimate clinical disposition will be pending results.  Independent review of CT imaging reveals findings consistent with ileitis.  No admit at this time for further workup and evaluation. Patient is a 35-year-old female presents to the emergency room with a chief complaint of abdominal pain.  Patient with no significant past medical history.  Reports that she has been experiencing diffuse abdominal pain for the last 8 days and for the last 7 days she has had multiple episodes of nonbloody diarrhea although the diarrhea has improved since yesterday.  She does note 1 episode of vomiting 2 days ago but has had continued nausea.  Denies any fever chest pain or shortness of breath.  Again no known blood noted in stool.  On exam patient is lying in bed no acute distress normocephalic atraumatic pupils equal round and reactive hearts regular rate lungs clear to auscultation abdomen is soft with diffuse tenderness to palpation no significant the right upper quadrant and right lower quadrant positive bowel sounds noted no skin rashes noted neuroexam is nonfocal.  Patient's presenting to the emergency room with diffuse abdominal pain and diarrhea.  Differential includes colitis versus diverticulitis versus additional intra-abdominal pathology.  Will obtain screening labs CT imaging hydrate medicate for symptoms begin antibiotics as needed obtain stool studies if able to do monitor.  Patient denies any recent antibiotic use.  Ultimate clinical disposition will be pending results.  Independent review of CT imaging reveals findings consistent with ileitis.  No admit at this time for further workup and evaluation. Independent review of chest x-ray reveals no acute infiltrate independent review of EKG reveals a normal sinus rhythm at 84 bpm.

## 2025-07-04 NOTE — H&P ADULT - NSHPPHYSICALEXAM_GEN_ALL_CORE
-    Vital Signs Last 24 Hrs  T(C): 36.9 (04 Jul 2025 20:42), Max: 37.1 (04 Jul 2025 17:58)  T(F): 98.5 (04 Jul 2025 20:42), Max: 98.8 (04 Jul 2025 17:58)  HR: 86 (04 Jul 2025 20:42) (86 - 105)  BP: 102/65 (04 Jul 2025 20:42) (102/65 - 115/78)  BP(mean): --  RR: 18 (04 Jul 2025 20:42) (18 - 18)  SpO2: 98% (04 Jul 2025 20:42) (98% - 99%)    Parameters below as of 04 Jul 2025 20:42  Patient On (Oxygen Delivery Method): room air          PHYSICAL EXAM:  		  GENERAL: Well-groomed, well-developed, not in any distress.  HEAD:  Atraumatic, Norm cephalic.  EYES: PERRLA, conjunctiva clear.  ENMT: no nasal discharge, MMM.   NECK: Supple, No JVD.  NERVOUS SYSTEM:  Alert & oriented X3, neurologically intact grossly.  CHEST/LUNG: Good air entry B/L, no rales, rhonchi, or wheezing.  HEART: Normal S1 & S2, no murmurs, or extra sounds.  ABDOMEN: Soft, tender RLQ, no guarding, no rebound tenderness, non-distended; bowel sounds present, no palpable masses or organomegaly.  EXTREMITIES:  No clubbing, cyanosis, or edema.  VASCULAR: 2+ radial, DPA / PTA pulses B/L.  SKIN: No rashes or lesions.  PSYCH: normal affect & behavior.

## 2025-07-04 NOTE — H&P ADULT - PROBLEM SELECTOR PLAN 2
ML related to the above, received 1000 ml NS bolus earlier by ED team, maintain at 150 ml/h of LR, encourage PO fluid intake, I & O monitoring, trend TLC & monitor Temp, in addition to the above management, ID consulted.

## 2025-07-04 NOTE — H&P ADULT - PROBLEM SELECTOR PLAN 7
possible IBD versus infectious diarrhea, started her on B/L intermittent pneumatic compression device while in bed for mechanical DVT prophylaxis, in addition to full ambulation as tolerated.

## 2025-07-04 NOTE — ED PROVIDER NOTE - INTERPRETER'S NAME
The history is provided by the patient. Chest Pain (Angina)   This is a new problem. The current episode started more than 2 days ago. The problem has not changed since onset. Duration of episode(s) is 1 week. The pain is associated with coughing and movement. The pain is present in the left side. The pain is mild. The quality of the pain is described as sharp. The pain does not radiate. The symptoms are aggravated by certain positions and deep breathing (+coughing). Associated symptoms include lower extremity edema (baseline). Pertinent negatives include no back pain, no cough, no exertional chest pressure, no fever, no hemoptysis, no palpitations, no shortness of breath, no vomiting and no weakness. He has tried nothing for the symptoms. The treatment provided no relief. Risk factors: h/o HHT, not on anticoagulation. His past medical history does not include DVT or PE. Rib Pain  Associated symptoms include chest pain (see HPI). Pertinent negatives include no fever, no neck pain, no cough, no hemoptysis, no vomiting and no leg swelling. Associated medical issues do not include PE or DVT.         Past Medical History:   Diagnosis Date    Cancer [facility])     HHT (hereditary hemorrhagic telangiectasia) ([facility] [address]     Prostate cancer [facility])        Past Surgical History:   Procedure Laterality Date    COLONOSCOPY N/A 1/29/2020    COL[facility] ENDOSCOPY    HX APPENDECTOMY      HX COLONOSCOPY  2014    due 23    HX OTHER SURGICAL  07/2018    sclerotheropy    HX TONSILLECTOMY      [place] CHEST SURGERY PROCEDURE UNLISTED      excision of AVM         Family History:   Problem Relation Age of Onset    Heart Disease Mother     Cancer Other         colon, lung       Social History     Socioeconomic History    Marital status:      Spouse name: Not on file    Number of children: Not on file    Years of education: Not on file    Highest education level: Not on file   Occupational History    Not on file   Tobacco Use    Smoking status: Former Smoker    Smokeless tobacco: Never Used   Vaping Use    Vaping Use: Never used   Substance and Sexual Activity    Alcohol use: Yes     Alcohol/week: 0.0 standard drinks     Comment: one drink per week    Drug use: No    Sexual activity: Yes     Partners: Female   Other Topics Concern    Not on file   Social History Narrative    Not on file     Social Determinants of Health     Financial Resource Strain:     Difficulty of Paying Living Expenses: Not on file   Food Insecurity:     Worried About Running Out of Food in the Last Year: Not on file    Bisi of Food in the Last Year: Not on file   Transportation Needs:     Lack of Transportation (Medical): Not on file    Lack of Transportation (Non-Medical): Not on file   Physical Activity:     Days of Exercise per Week: Not on file    Minutes of Exercise per Session: Not on file   Stress:     Feeling of Stress : Not on file   Social Connections:     Frequency of Communication with Friends and Family: Not on file    Frequency of Social Gatherings with Friends and Family: Not on file    Attends Confucianism Services: Not on file    Active Member of [address] [place] [address] or Organizations: Not on file    Attends Club or Organization Meetings: Not on file    Marital Status: Not on file   Intimate Partner Violence:     Fear of Current or Ex-Partner: Not on file    Emotionally Abused: Not on file    Physically Abused: Not on file    Sexually Abused: Not on file   Housing Stability:     Unable to Pay for Housing in the Last Year: Not on file    Number of [place] in the Last Year: Not on file    Unstable Housing in the Last Year: Not on file         ALLERGIES: Patient has no known allergies. Review of Systems   Constitutional: Negative for fever. Respiratory: Negative for cough, hemoptysis and shortness of breath. Cardiovascular: Positive for chest pain (see HPI).  Negative for palpitations and leg swelling. Gastrointestinal: Negative for vomiting. Musculoskeletal: Negative for back pain and neck pain. Skin: Negative for color change and wound. Neurological: Negative for weakness. All other systems reviewed and are negative. Vitals:    04/27/22 1022   BP: 124/63   Pulse: 73   Resp: 18   Temp: 98.8 °F (37.1 °C)   SpO2: 99%   Weight: 84.8 kg (186 lb 15.2 oz)   Height: 6' (1.829 m)            Physical Exam  Vitals and nursing note reviewed. Constitutional:       General: He is not in acute distress. Appearance: He is well-developed. HENT:      Head: Normocephalic and atraumatic. Eyes:      Conjunctiva/sclera: Conjunctivae normal.   Cardiovascular:      Rate and Rhythm: Normal rate. Rhythm irregular. Heart sounds: No murmur heard. Pulmonary:      Effort: Pulmonary effort is normal. No respiratory distress. Breath sounds: Normal breath sounds. Chest:      Chest wall: Tenderness (over L anterior rib #5, no crepitus) present. Abdominal:      Palpations: Abdomen is soft. Tenderness: There is no abdominal tenderness. There is no guarding. Musculoskeletal:         General: No tenderness, deformity or signs of injury. Normal range of motion. Cervical back: Normal range of motion and neck supple. Right lower leg: Edema (1+) present. Left lower leg: Edema (1+) present. Skin:     General: Skin is warm and dry. Neurological:      Mental Status: He is alert and oriented to person, place, and time. Motor: No abnormal muscle tone. MDM       Procedures      EKG interpretation: 10:15  Rhythm: atrial flutter with variable AV block and an incomplete RBBB; and irregular. Rate (approx.): 72; Axis: normal; Intervals: QTc 422 ms; ST/T wave: no STEMI; EKG documented and interpreted by Halie Winchester MD, ED MD.    Assessment and plan:  This is a 78-year-old male who fell approximately 1 week ago while walking outside with left chest wall pain since then.  There is reproducible chest wall tenderness over the area of concern. Differential diagnosis includes rib fracture, chest wall strain, costochondritis, less likely pneumothorax. No clinical signs and symptoms of ACS, PE, thoracic aortic dissection. He was also found to be in atrial flutter which is a new diagnosis for him. Reports a history of HHT which has caused bleeding in the past and therefore he has not been a candidate for anticoagulation previously, according to patient. I am hesitant to start anticoagulation today given his history and will refer him to cardiology for further management of this new arrhythmia. Eran

## 2025-07-04 NOTE — ED PROVIDER NOTE - ABDOMINAL TENDER
diffuse abdominal tenderness, more localized to epigastric, RUQ and RLQ/right upper quadrant/right lower quadrant/epigastric

## 2025-07-05 LAB
ANION GAP SERPL CALC-SCNC: 12 MMOL/L — SIGNIFICANT CHANGE UP (ref 5–17)
BASOPHILS # BLD AUTO: 0.02 K/UL — SIGNIFICANT CHANGE UP (ref 0–0.2)
BASOPHILS NFR BLD AUTO: 0.2 % — SIGNIFICANT CHANGE UP (ref 0–2)
BUN SERPL-MCNC: 8 MG/DL — SIGNIFICANT CHANGE UP (ref 7–23)
CALCIUM SERPL-MCNC: 8.9 MG/DL — SIGNIFICANT CHANGE UP (ref 8.4–10.5)
CHLORIDE SERPL-SCNC: 106 MMOL/L — SIGNIFICANT CHANGE UP (ref 96–108)
CO2 SERPL-SCNC: 22 MMOL/L — SIGNIFICANT CHANGE UP (ref 22–31)
CREAT SERPL-MCNC: 0.8 MG/DL — SIGNIFICANT CHANGE UP (ref 0.5–1.3)
EC EAEA GENE STL QL NAA+PROBE: DETECTED
EGFR: 97 ML/MIN/1.73M2 — SIGNIFICANT CHANGE UP
EGFR: 97 ML/MIN/1.73M2 — SIGNIFICANT CHANGE UP
EOSINOPHIL # BLD AUTO: 0.01 K/UL — SIGNIFICANT CHANGE UP (ref 0–0.5)
EOSINOPHIL NFR BLD AUTO: 0.1 % — SIGNIFICANT CHANGE UP (ref 0–6)
FERRITIN SERPL-MCNC: 15 NG/ML — SIGNIFICANT CHANGE UP (ref 15–150)
GI PCR PANEL: DETECTED
GLUCOSE SERPL-MCNC: 140 MG/DL — HIGH (ref 70–99)
HCT VFR BLD CALC: 28.6 % — LOW (ref 34.5–45)
HGB BLD-MCNC: 8.3 G/DL — LOW (ref 11.5–15.5)
IMM GRANULOCYTES # BLD AUTO: 0.06 K/UL — SIGNIFICANT CHANGE UP (ref 0–0.07)
IMM GRANULOCYTES NFR BLD AUTO: 0.6 % — SIGNIFICANT CHANGE UP (ref 0–0.9)
IRON SATN MFR SERPL: 13 UG/DL — LOW (ref 30–160)
IRON SATN MFR SERPL: 3 % — LOW (ref 14–50)
LIDOCAIN IGE QN: 183 U/L — HIGH (ref 16–77)
LYMPHOCYTES # BLD AUTO: 1.75 K/UL — SIGNIFICANT CHANGE UP (ref 1–3.3)
LYMPHOCYTES NFR BLD AUTO: 16.5 % — SIGNIFICANT CHANGE UP (ref 13–44)
MAGNESIUM SERPL-MCNC: 2.3 MG/DL — SIGNIFICANT CHANGE UP (ref 1.6–2.6)
MCHC RBC-ENTMCNC: 18.9 PG — LOW (ref 27–34)
MCHC RBC-ENTMCNC: 29 G/DL — LOW (ref 32–36)
MCV RBC AUTO: 65.3 FL — LOW (ref 80–100)
MONOCYTES # BLD AUTO: 0.09 K/UL — SIGNIFICANT CHANGE UP (ref 0–0.9)
MONOCYTES NFR BLD AUTO: 0.8 % — LOW (ref 2–14)
NEUTROPHILS # BLD AUTO: 8.69 K/UL — HIGH (ref 1.8–7.4)
NEUTROPHILS NFR BLD AUTO: 81.8 % — HIGH (ref 43–77)
NRBC # BLD AUTO: 0 K/UL — SIGNIFICANT CHANGE UP (ref 0–0)
NRBC # FLD: 0 K/UL — SIGNIFICANT CHANGE UP (ref 0–0)
NRBC BLD AUTO-RTO: 0 /100 WBCS — SIGNIFICANT CHANGE UP (ref 0–0)
PLATELET # BLD AUTO: 439 K/UL — HIGH (ref 150–400)
PMV BLD: 8.4 FL — SIGNIFICANT CHANGE UP (ref 7–13)
POTASSIUM SERPL-MCNC: 4.8 MMOL/L — SIGNIFICANT CHANGE UP (ref 3.5–5.3)
POTASSIUM SERPL-SCNC: 4.8 MMOL/L — SIGNIFICANT CHANGE UP (ref 3.5–5.3)
RBC # BLD: 4.38 M/UL — SIGNIFICANT CHANGE UP (ref 3.8–5.2)
RBC # FLD: 19 % — HIGH (ref 10.3–14.5)
SHIGELLA DNA SPEC QL NAA+PROBE: DETECTED
SODIUM SERPL-SCNC: 140 MMOL/L — SIGNIFICANT CHANGE UP (ref 135–145)
TIBC SERPL-MCNC: 411 UG/DL — SIGNIFICANT CHANGE UP (ref 220–430)
UIBC SERPL-MCNC: 398 UG/DL — HIGH (ref 110–370)
WBC # BLD: 10.62 K/UL — HIGH (ref 3.8–10.5)
WBC # FLD AUTO: 10.62 K/UL — HIGH (ref 3.8–10.5)

## 2025-07-05 PROCEDURE — 83993 ASSAY FOR CALPROTECTIN FECAL: CPT

## 2025-07-05 PROCEDURE — 36415 COLL VENOUS BLD VENIPUNCTURE: CPT

## 2025-07-05 PROCEDURE — 71045 X-RAY EXAM CHEST 1 VIEW: CPT

## 2025-07-05 PROCEDURE — 87086 URINE CULTURE/COLONY COUNT: CPT

## 2025-07-05 PROCEDURE — 86900 BLOOD TYPING SEROLOGIC ABO: CPT

## 2025-07-05 PROCEDURE — 81025 URINE PREGNANCY TEST: CPT

## 2025-07-05 PROCEDURE — 85025 COMPLETE CBC W/AUTO DIFF WBC: CPT

## 2025-07-05 PROCEDURE — 82272 OCCULT BLD FECES 1-3 TESTS: CPT

## 2025-07-05 PROCEDURE — 86901 BLOOD TYPING SEROLOGIC RH(D): CPT

## 2025-07-05 PROCEDURE — 99233 SBSQ HOSP IP/OBS HIGH 50: CPT

## 2025-07-05 PROCEDURE — 87045 FECES CULTURE AEROBIC BACT: CPT

## 2025-07-05 PROCEDURE — 83540 ASSAY OF IRON: CPT

## 2025-07-05 PROCEDURE — 80053 COMPREHEN METABOLIC PANEL: CPT

## 2025-07-05 PROCEDURE — 81001 URINALYSIS AUTO W/SCOPE: CPT

## 2025-07-05 PROCEDURE — 85730 THROMBOPLASTIN TIME PARTIAL: CPT

## 2025-07-05 PROCEDURE — 87507 IADNA-DNA/RNA PROBE TQ 12-25: CPT

## 2025-07-05 PROCEDURE — 83690 ASSAY OF LIPASE: CPT

## 2025-07-05 PROCEDURE — 82728 ASSAY OF FERRITIN: CPT

## 2025-07-05 PROCEDURE — 85610 PROTHROMBIN TIME: CPT

## 2025-07-05 PROCEDURE — 83550 IRON BINDING TEST: CPT

## 2025-07-05 PROCEDURE — 83735 ASSAY OF MAGNESIUM: CPT

## 2025-07-05 PROCEDURE — 74177 CT ABD & PELVIS W/CONTRAST: CPT

## 2025-07-05 PROCEDURE — 80048 BASIC METABOLIC PNL TOTAL CA: CPT

## 2025-07-05 PROCEDURE — 86850 RBC ANTIBODY SCREEN: CPT

## 2025-07-05 RX ORDER — LACTOBACILLUS ACIDOPHILUS/PECT 75 MM-100
1 CAPSULE ORAL
Refills: 0 | Status: DISCONTINUED | OUTPATIENT
Start: 2025-07-05 | End: 2025-07-07

## 2025-07-05 RX ADMIN — Medication 100 MILLIEQUIVALENT(S): at 05:22

## 2025-07-05 RX ADMIN — Medication 100 MILLIEQUIVALENT(S): at 01:40

## 2025-07-05 RX ADMIN — SODIUM CHLORIDE 150 MILLILITER(S): 9 INJECTION, SOLUTION INTRAVENOUS at 08:55

## 2025-07-05 RX ADMIN — Medication 1 TABLET(S): at 18:02

## 2025-07-05 RX ADMIN — Medication 250 MILLIGRAM(S): at 22:37

## 2025-07-05 RX ADMIN — Medication 1 TABLET(S): at 22:50

## 2025-07-05 RX ADMIN — Medication 100 MILLIEQUIVALENT(S): at 00:34

## 2025-07-05 RX ADMIN — PREDNISONE 40 MILLIGRAM(S): 20 TABLET ORAL at 22:37

## 2025-07-05 NOTE — PROGRESS NOTE ADULT - PROBLEM SELECTOR PLAN 7
possible IBD versus infectious diarrhea, started her on B/L intermittent pneumatic compression device while in bed for mechanical DVT prophylaxis, in addition to full ambulation as tolerated. possible IBD versus infectious diarrhea, on B/L intermittent pneumatic compression device while in bed for mechanical DVT prophylaxis, in addition to full ambulation as tolerated

## 2025-07-05 NOTE — PATIENT PROFILE ADULT - FALL HARM RISK - HARM RISK INTERVENTIONS

## 2025-07-05 NOTE — PROGRESS NOTE ADULT - PROBLEM SELECTOR PLAN 2
ML related to the above, received 1000 ml NS bolus earlier by ED team, maintain at 150 ml/h of LR, encourage PO fluid intake, I & O monitoring, trend TLC & monitor Temp, in addition to the above management, ID consulted. prob GI source  IVF resuscitation  IV ABX

## 2025-07-05 NOTE — PROGRESS NOTE ADULT - PROBLEM SELECTOR PLAN 5
ML iron deficiency anemia, may be related to ileal absorption of iron, no reported bleeding from anywhere, negative stool for FOB, check serum iron level, serum ferritin, and TIBC. - iron deficiency anemia, may be related to ileal absorption of iron, no reported bleeding from anywhere, negative stool for FOB, check serum iron level, serum ferritin, and TIBC.

## 2025-07-05 NOTE — CONSULT NOTE ADULT - SUBJECTIVE AND OBJECTIVE BOX
Chief Complaint:  Patient is a 35y old  Female who presents with a chief complaint of Abdominal pain suddenly styarted a few days ago after eeating some eggs and some chicken not undercooked she reports some  pain and some rlq pain also. never had an  upper gastrointestinal endoscopy or a colonoscopy.    History of Present Illness:   This is a 36 y/o F with no significant PMH who presented with diffuse crampy abdominal pain, mostly around the umbilicus & RLQ, started 8 days ago, and watery non bloody diarrhea that started the following day, about 5 times a day, last was yesterday am, also reports having a single episode of vomiting 2 days ago, vomitus was unremarkable, no fever or chills, no household with similar symptoms, denies any deli or undercooked/ raw meat consumption, no H/O of similar episodes in the past, and no FH of IBD.       Review of Systems:  Review of Systems: -    CONSTITUTIONAL: No fever or chills.  EYES: No eye pain, visual disturbances, or discharge.  ENMT:  No difficulty hearing, vertigo, sinus or throat pain.  NECK: No pain or stiffness.	  RESPIRATORY: No cough, wheezing, or hemoptysis; No shortness of breath.  CARDIOVASCULAR: No chest pain, palpitations, dizziness, or leg swelling.  GASTROINTESTINAL: (+) abdominal pain, nausea, vomiting, and diarrhea, no hematemesis; no melena or hematochezia.  GENITOURINARY: No dysuria, frequency, hematuria, or incontinence.  NEUROLOGICAL: No headaches, focal muscle weakness, numbness, or tremors.  MUSCULOSKELETAL: No joint swelling or pain.  PSYCHIATRIC: No depression, anxiety, or agitation.    Allergies:  No Known Allergies      Medications:  acetaminophen     Tablet .. 650 milliGRAM(s) Oral every 6 hours PRN  azithromycin  IVPB 500 milliGRAM(s) IV Intermittent every 24 hours  azithromycin  IVPB      lactated ringers. 1000 milliLiter(s) IV Continuous <Continuous>  lactobacillus acidophilus 1 Tablet(s) Oral four times a day with meals  predniSONE   Tablet 40 milliGRAM(s) Oral at bedtime      PMHX/PSHX:      Family history:  No pertinent family history in first degree relatives  no ibd history    Social History:   no etoh no cigs no ivda    ROS:     General:  No wt loss, fevers, chills, night sweats, fatigue,   Eyes:  Good vision, no reported pain  ENT:  No sore throat, pain, runny nose, dysphagia  CV:  No pain, palpitations, hypo/hypertension  Resp:  No dyspnea, cough, tachypnea, wheezing  GI:  No pain, No nausea, No vomiting, No diarrhea, No constipation, No weight loss, No fever, No pruritis, No rectal bleeding, No tarry stools, No dysphagia,  :  No pain, bleeding, incontinence, nocturia  Muscle:  No pain, weakness  Neuro:  No weakness, tingling, memory problems  Psych:  No fatigue, insomnia, mood problems, depression  Endocrine:  No polyuria, polydipsia, cold/heat intolerance  Heme:  No petechiae, ecchymosis, easy bruisability  Skin:  No rash, tattoos, scars, edema      PHYSICAL EXAM:   Vital Signs:  Vital Signs Last 24 Hrs  T(C): 36.7 (05 Jul 2025 07:27), Max: 37.1 (04 Jul 2025 17:58)  T(F): 98 (05 Jul 2025 07:27), Max: 98.8 (04 Jul 2025 17:58)  HR: 92 (05 Jul 2025 13:01) (76 - 105)  BP: 100/56 (05 Jul 2025 13:01) (90/56 - 116/82)  BP(mean): 74 (05 Jul 2025 10:51) (74 - 93)  RR: 14 (05 Jul 2025 13:01) (14 - 24)  SpO2: 99% (05 Jul 2025 13:01) (98% - 100%)    Parameters below as of 05 Jul 2025 07:27  Patient On (Oxygen Delivery Method): room air      Daily Height in cm: 157.48 (04 Jul 2025 17:58)    Daily     GENERAL:  Appears stated age, well-groomed, well-nourished, no distress  HEENT:  NC/AT,  conjunctivae clear and pink, no thyromegaly, nodules, adenopathy, no JVD, sclera -anicteric  CHEST:  Full & symmetric excursion, no increased effort, breath sounds clear  HEART:  Regular rhythm, S1, S2, no murmur/rub/S3/S4, no abdominal bruit, no edema  ABDOMEN:  Soft, non-tender, non-distended, normoactive bowel sounds,  no masses ,no hepato-splenomegaly, no signs of chronic liver disease  EXTEREMITIES:  no cyanosis,clubbing or edema  SKIN:  No rash/erythema/ecchymoses/petechiae/wounds/abscess/warm/dry  NEURO:  Alert, oriented, no asterixis, no tremor, no encephalopathy    LABS:                        8.3    10.62 )-----------( 439      ( 05 Jul 2025 06:00 )             28.6     07-05    140  |  106  |  8   ----------------------------<  140[H]  4.8   |  22  |  0.80    Ca    8.9      05 Jul 2025 06:00  Mg     2.3     07-05    TPro  8.1  /  Alb  3.2[L]  /  TBili  0.2  /  DBili  x   /  AST  29  /  ALT  35  /  AlkPhos  137[H]  07-04    LIVER FUNCTIONS - ( 04 Jul 2025 18:41 )  Alb: 3.2 g/dL / Pro: 8.1 g/dL / ALK PHOS: 137 U/L / ALT: 35 U/L / AST: 29 U/L / GGT: x           PT/INR - ( 04 Jul 2025 19:10 )   PT: 13.5 sec;   INR: 1.17 ratio         PTT - ( 04 Jul 2025 19:10 )  PTT:24.1 sec  Urinalysis Basic - ( 05 Jul 2025 06:00 )    Color: x / Appearance: x / SG: x / pH: x  Gluc: 140 mg/dL / Ketone: x  / Bili: x / Urobili: x   Blood: x / Protein: x / Nitrite: x   Leuk Esterase: x / RBC: x / WBC x   Sq Epi: x / Non Sq Epi: x / Bacteria: x      Amylase Serum--      Lipase [id number]       Ammonia--  Amylase Serum--      Lipase [id number]       Ammonia--      Imaging:

## 2025-07-05 NOTE — PROGRESS NOTE ADULT - PROBLEM SELECTOR PLAN 3
ML 2ry to GI loss, no related EKG abnormality, started her on telemetry, patient received 40 meq of potassium chloride PO X1 dose earlier, gave additional 3 doses of potassium chloride IVPB, check serum potassium level in am, also recheck serum magnesium level. - likely due to recent diarrhea  - monitor  - supplement PRN

## 2025-07-05 NOTE — ED ADULT NURSE REASSESSMENT NOTE - NS ED NURSE REASSESS COMMENT FT1
pt defecated and cultures sent. stool fully formed dark stool.
received report from GREG Ferreira. Pt resting in stretcher. IVFs infusing. Pt taken to CT.
verbal report given to hold GREG Rush. Pt resting in stretcher. on CM.
pt received on stretcher skin warm and dry no distress. . pt aaox3 skin warm and dry no resp distress lungs clear and equal b/l ascultation abd soft  tender ruq pegion + bs and hypoactive bowel sounds. denies nausea. denies vomiting or diarrhea. pain scale 2/10. iv intact no s/s infiltration. pt pending tele bed.

## 2025-07-05 NOTE — PROGRESS NOTE ADULT - PROBLEM SELECTOR PLAN 1
- Terminal Ileitis as suggested by CT with RLQ mesenteric LN enlargement, possibly infectious, r/o Crohn's 1st episode  - tested negative for FOB, contact precautions pending GI- PCR & stool culture results, pain control, IVF hydration, I & O, received one dose of Zosyn earlier by ED team, changed to Azithromycin 500 mg IVPB daily 1st dose given stat , also started her empirically on Prednisone 40 mg at bedtime 1st dose stat, stool for Calprotectin, GI consult with Dr. Zhang was called earlier by ED team, called ID consult with Dr. Kelley. - Terminal Ileitis as suggested by CT with RLQ mesenteric LN enlargement, possibly infectious, r/o Crohn's 1st episode  - tested negative for FOB, contact precautions pending GI- PCR & stool culture results  - pain control, IVF hydration  - received one dose of Zosyn in ED, changed to Azithromycin 500 mg IVPB daily 1st dose given stat , also started her empirically on Prednisone 40 mg at bedtime  - stool for Calprotectin  - GI consult with Dr. Zhang   - ID consult with Dr. Kelley

## 2025-07-05 NOTE — CONSULT NOTE ADULT - ASSESSMENT
abd pain  ileitis  infectious    plan  gi pcr  probiotic  cont abx  bacid 1 tab po tid  follow up stool studies  monitor stool consistency/frequency   replete electrolytes as needed   low residue diet as tolerated   colonosocpy as oupt in 4 to 6 weeks

## 2025-07-06 LAB
ALBUMIN SERPL ELPH-MCNC: 2.8 G/DL — LOW (ref 3.3–5)
ALP SERPL-CCNC: 118 U/L — SIGNIFICANT CHANGE UP (ref 30–120)
ALT FLD-CCNC: 38 U/L — SIGNIFICANT CHANGE UP (ref 10–60)
ANION GAP SERPL CALC-SCNC: 10 MMOL/L — SIGNIFICANT CHANGE UP (ref 5–17)
AST SERPL-CCNC: 24 U/L — SIGNIFICANT CHANGE UP (ref 10–40)
BILIRUB SERPL-MCNC: 0.2 MG/DL — SIGNIFICANT CHANGE UP (ref 0.2–1.2)
BUN SERPL-MCNC: 6 MG/DL — LOW (ref 7–23)
CALCIUM SERPL-MCNC: 8.9 MG/DL — SIGNIFICANT CHANGE UP (ref 8.4–10.5)
CHLORIDE SERPL-SCNC: 105 MMOL/L — SIGNIFICANT CHANGE UP (ref 96–108)
CO2 SERPL-SCNC: 25 MMOL/L — SIGNIFICANT CHANGE UP (ref 22–31)
CREAT SERPL-MCNC: 0.75 MG/DL — SIGNIFICANT CHANGE UP (ref 0.5–1.3)
CULTURE RESULTS: SIGNIFICANT CHANGE UP
EGFR: 105 ML/MIN/1.73M2 — SIGNIFICANT CHANGE UP
EGFR: 105 ML/MIN/1.73M2 — SIGNIFICANT CHANGE UP
GLUCOSE SERPL-MCNC: 140 MG/DL — HIGH (ref 70–99)
HCT VFR BLD CALC: 27 % — LOW (ref 34.5–45)
HGB BLD-MCNC: 7.8 G/DL — LOW (ref 11.5–15.5)
MCHC RBC-ENTMCNC: 18.7 PG — LOW (ref 27–34)
MCHC RBC-ENTMCNC: 28.9 G/DL — LOW (ref 32–36)
MCV RBC AUTO: 64.7 FL — LOW (ref 80–100)
NRBC # BLD AUTO: 0 K/UL — SIGNIFICANT CHANGE UP (ref 0–0)
NRBC # FLD: 0 K/UL — SIGNIFICANT CHANGE UP (ref 0–0)
NRBC BLD AUTO-RTO: 0 /100 WBCS — SIGNIFICANT CHANGE UP (ref 0–0)
PLATELET # BLD AUTO: 477 K/UL — HIGH (ref 150–400)
PMV BLD: 8.9 FL — SIGNIFICANT CHANGE UP (ref 7–13)
POTASSIUM SERPL-MCNC: 3.9 MMOL/L — SIGNIFICANT CHANGE UP (ref 3.5–5.3)
POTASSIUM SERPL-SCNC: 3.9 MMOL/L — SIGNIFICANT CHANGE UP (ref 3.5–5.3)
PROT SERPL-MCNC: 7.3 G/DL — SIGNIFICANT CHANGE UP (ref 6–8.3)
RBC # BLD: 4.17 M/UL — SIGNIFICANT CHANGE UP (ref 3.8–5.2)
RBC # FLD: 19.4 % — HIGH (ref 10.3–14.5)
SODIUM SERPL-SCNC: 140 MMOL/L — SIGNIFICANT CHANGE UP (ref 135–145)
SPECIMEN SOURCE: SIGNIFICANT CHANGE UP
WBC # BLD: 7.78 K/UL — SIGNIFICANT CHANGE UP (ref 3.8–10.5)
WBC # FLD AUTO: 7.78 K/UL — SIGNIFICANT CHANGE UP (ref 3.8–10.5)

## 2025-07-06 PROCEDURE — 99233 SBSQ HOSP IP/OBS HIGH 50: CPT

## 2025-07-06 RX ORDER — SODIUM CHLORIDE 9 G/1000ML
1000 INJECTION, SOLUTION INTRAVENOUS
Refills: 0 | Status: DISCONTINUED | OUTPATIENT
Start: 2025-07-06 | End: 2025-07-07

## 2025-07-06 RX ORDER — FERROUS SULFATE 137(45) MG
325 TABLET, EXTENDED RELEASE ORAL DAILY
Refills: 0 | Status: DISCONTINUED | OUTPATIENT
Start: 2025-07-07 | End: 2025-07-07

## 2025-07-06 RX ADMIN — Medication 1 TABLET(S): at 23:01

## 2025-07-06 RX ADMIN — Medication 250 MILLIGRAM(S): at 21:05

## 2025-07-06 RX ADMIN — Medication 1 TABLET(S): at 17:47

## 2025-07-06 RX ADMIN — Medication 1 TABLET(S): at 08:34

## 2025-07-06 RX ADMIN — Medication 1 TABLET(S): at 12:49

## 2025-07-06 NOTE — DIETITIAN INITIAL EVALUATION ADULT - PERTINENT MEDS FT
MEDICATIONS  (STANDING):  azithromycin  IVPB 500 milliGRAM(s) IV Intermittent every 24 hours  azithromycin  IVPB      lactated ringers. 1000 milliLiter(s) (100 mL/Hr) IV Continuous <Continuous>  lactobacillus acidophilus 1 Tablet(s) Oral four times a day with meals    MEDICATIONS  (PRN):  acetaminophen     Tablet .. 650 milliGRAM(s) Oral every 6 hours PRN Temp greater or equal to 38C (100.4F), Mild Pain (1 - 3)

## 2025-07-06 NOTE — DIETITIAN INITIAL EVALUATION ADULT - REASON FOR ADMISSION
Abdominal pain.    Per H&P, "This is a 34 y/o F with no significant PMH who presented with diffuse crampy abdominal pain, mostly around the umbilicus & RLQ, started 8 days ago, and watery non bloody diarrhea that started the following day, about 5 times a day, last was yesterday am, also reports having a single episode of vomiting 2 days ago, vomitus was unremarkable, no fever or chills, no household with similar symptoms, denies any deli or undercooked/ raw meat consumption, no H/O of similar episodes in the past, and no FH of IBD."

## 2025-07-06 NOTE — DIETITIAN INITIAL EVALUATION ADULT - PROBLEM SELECTOR PLAN 2
Attending with
ML related to the above, received 1000 ml NS bolus earlier by ED team, maintain at 150 ml/h of LR, encourage PO fluid intake, I & O monitoring, trend TLC & monitor Temp, in addition to the above management, ID consulted.

## 2025-07-06 NOTE — DIETITIAN INITIAL EVALUATION ADULT - OTHER INFO
Nutrition consult ordered for MST >2 score.  Pt's primary language is Wolof,  #[id number] (Cayden) provided translation.  Pt admitted with abd pain, dx with ileitis/sepsis.  Pt started on clear liquids, tolerated well and progressed to low fat, low fiber, no dairy today for lunch.  Pt endorses eating lunch, feels well overall.  Pt previously experiencing diarrhea PTA, which has reportedly resolved at present time.  Pt lives at home - usually consumes 3x meals per day (dietary recall indicated above).  Appetite has been decreased x ~8 days PTA secondary to not feeling well.  Provided verbal and written (Wolof) education on low fiber and low fat diet based on foods usually consumed; reinforced importance of gradual increase of fiber intake.  RD to remain available and will continue to follow up per protocol.

## 2025-07-06 NOTE — DIETITIAN INITIAL EVALUATION ADULT - PERTINENT LABORATORY DATA
07-06    140  |  105  |  6[L]  ----------------------------<  140[H]  3.9   |  25  |  0.75    Ca    8.9      06 Jul 2025 06:00  Mg     2.3     07-05    TPro  7.3  /  Alb  2.8[L]  /  TBili  0.2  /  DBili  x   /  AST  24  /  ALT  38  /  AlkPhos  118  07-06

## 2025-07-06 NOTE — CONSULT NOTE ADULT - SUBJECTIVE AND OBJECTIVE BOX
HPI:  36YO  F with no significant PMH who presented with diffuse crampy abdominal pain, mostly around the umbilicus & RLQ, started 8 days ago, and watery non bloody diarrhea that started the following day, about 5 times a day admitted with infectious gastroenteritis CT AP showed ileitis.     Infectious Disease consult was called to evaluate pt and for antibiotic management.      Past Medical & Surgical Hx:  PAST MEDICAL & SURGICAL HISTORY:      Social History--  EtOH: denies   Tobacco: denies   Drug Use: denies    FAMILY HISTORY:  No pertinent family history in first degree relatives      Allergies  No Known Allergies    Intolerances  NONE    Home Medications:      Current Inpatient Medications :    ANTIBIOTICS:   azithromycin  IVPB 500 milliGRAM(s) IV Intermittent every 24 hours  azithromycin  IVPB          OTHER RELEVANT MEDICATIONS :  acetaminophen     Tablet .. 650 milliGRAM(s) Oral every 6 hours PRN  ferrous    sulfate 325 milliGRAM(s) Oral daily  lactated ringers. 1000 milliLiter(s) IV Continuous <Continuous>      ROS:  Unable to obtain due to :     ROS:  CONSTITUTIONAL:  Negative fever or chills  EYES:  Negative  blurry vision or double vision  CARDIOVASCULAR:  Negative for chest pain or palpitations  RESPIRATORY:  Negative for cough, wheezing, or SOB   GASTROINTESTINAL: + nausea, vomiting, diarrhea, abdominal pain  GENITOURINARY:  Negative frequency, urgency , dysuria or hematuria   NEUROLOGIC:  No headache, confusion, dizziness, lightheadedness  All other systems were reviewed and are negative        I&O's Detail    05 Jul 2025 07:01  -  06 Jul 2025 07:00  --------------------------------------------------------  IN:    IV PiggyBack: 500 mL    Lactated Ringers: 1800 mL    Oral Fluid: 120 mL  Total IN: 2420 mL    OUT:  Total OUT: 0 mL    Total NET: 2420 mL        Physical Exam:  Vital Signs Last 24 Hrs  T(C): 36.7 (06 Jul 2025 20:50), Max: 36.7 (06 Jul 2025 20:50)  T(F): 98 (06 Jul 2025 20:50), Max: 98 (06 Jul 2025 20:50)  HR: 75 (06 Jul 2025 20:50) (73 - 86)  BP: 104/70 (06 Jul 2025 20:50) (93/55 - 105/62)  RR: 18 (06 Jul 2025 20:50) (18 - 18)  SpO2: 97% (06 Jul 2025 20:50) (97% - 99%)    Parameters below as of 06 Jul 2025 20:50  Patient On (Oxygen Delivery Method): room air      General:  no acute distress  Neck: supple, trachea midline  Lungs: clear, no wheeze/rhonchi  Cardiovascular: regular rate and rhythm, S1 S2  Abdomen: soft, nontender, ND, bowel sounds normal  Neurological:  alert and oriented x3  Skin: no rash  Extremities: no edema    Labs:                 7.8    7.78  )-----------( 477      ( 06 Jul 2025 06:00 )             27.0   07-06    140  |  105  |  6[L]  ----------------------------<  140[H]  3.9   |  25  |  0.75    Ca    8.9      06 Jul 2025 06:00  Mg     2.3     07-05    TPro  7.3  /  Alb  2.8[L]  /  TBili  0.2  /  DBili  x   /  AST  24  /  ALT  38  /  AlkPhos  118  07-06       RECENT CULTURES:  GI PCR Panel Stool (07.05.25 @ 08:41)    GI PCR Panel: Detected: GI Panel PCR evaluates for:  Campylobacter, Plesiomonas shigelloides, Salmonella, Vibrio, Yersinia  enterocolitica, Enteroaggregative Escherichia (EAEC), Enteropathogenic E.  coli (EPEC), Enterotoxigenic E. coli (ETEC), Shiga-like toxin producing  E.coli (STEC), E. coli O157, Shigella/Enteroinvasive E. coli (EIEC),  Adenovirus, Astrovirus, Norovirus, Rotavirus, Sapovirus, Cryptosporidium,  Cyclospora cayetanensis, Entamoeba histolytica, Giardia lamblia.  For culture and susceptibility reports refer to "reflex stool culture".   Enteroaggregative E. coli (EAEC): Detected   Shigella/ Enteroinvasive E. coli: Detected: Test results will be confirmed by culture. If Shigella is isolated,  susceptibilities will be performed.      Culture - Stool (collected 05 Jul 2025 08:41)  Source: Stool Feces  Preliminary Report (06 Jul 2025 07:54):    No enteric pathogens to date: Final culture pending    Culture - Reflex Stool (collected 05 Jul 2025 08:41)  Source: Stool  Preliminary Report (06 Jul 2025 08:29):    Culture in progress    Urinalysis with Rflx Culture (collected 04 Jul 2025 18:41)    Culture - Urine (collected 04 Jul 2025 18:41)  Source: Clean Catch  Final Report (06 Jul 2025 08:20):    <10,000 CFU/mL Normal Urogenital Sylwia            RADIOLOGY & ADDITIONAL STUDIES:    ACC: [id number] EXAM:  CT ABDOMEN AND PELVIS IC   ORDERED BY: MELINA STEIN     PROCEDURE DATE:  07/04/2025          INTERPRETATION:  CLINICAL INFORMATION: Stomach pain. Diarrhea.    COMPARISON: None.    CONTRAST/COMPLICATIONS:  IV Contrast: 80 cc administered   20 cc discarded  Oral Contrast: None    PROCEDURE:  CT of the Abdomen and Pelvis was performed.  Sagittal and coronal reformats were performed.    FINDINGS:  LOWER CHEST: Within normal limits.    LIVER: Within normal limits.  BILE DUCTS: Normal caliber.  GALLBLADDER: Within normal limits.  SPLEEN: Within normal limits.  PANCREAS: Within normal limits.  ADRENALS: Within normal limits.  KIDNEYS/URETERS: Within normal limits.    BLADDER: Within normal limits.  REPRODUCTIVE ORGANS: Uterus and adnexa within normal limits.    BOWEL: No bowel obstruction. Appendix is normal. There is wall thickening   and increased mucosal enhancement of the terminal ileum measuring about   10 cm in length.  PERITONEUM/RETROPERITONEUM: No ascites or free air..  VESSELS: Within normal limits.  LYMPH NODES: Mildly enlarged right lower quadrant mesenteric lymph nodes.   No retroperitoneal lymphadenopathy.  ABDOMINAL WALL: Within normal limits.  BONES: Within normal limits.    IMPRESSION:  Findings consistent with ileitis of infectious or inflammatory change.    Assessment :   36YO  F with no significant PMH who presented with diffuse crampy abdominal pain, mostly around the umbilicus & RLQ, started 8 days ago, and watery non bloody diarrhea that started the following day, about 5 times a day admitted with infectious gastroenteritis CT AP showed ileitis.   GI PCR with Enteroaggregative E. coli (EAEC): Detected Shigella/ Enteroinvasive E. coli: Detected:  Leukocytosis resolved  Afebrile wbc wnl  Clinically better    Plan  Cont Zithromax x 3-5 days  Trend temps and cbc  Stable from ID standpoint  Dc home per primary team    Advance Directives- Full code  Current Medications are documented.   Drug-drug interactions reviewed.    Continue with present regiment .  Approptiate use of antibiotics and adverse effects reviewed.      I have discussed the above plan of care with patient/ in detail. They expressed understanding of the treatment plan . Risks, benefits and alternatives discussed in detail. I have asked if they have any questions or concerns and appropriately addressed them to the best of my ability .      > 45 minutes spent in direct patient care reviewing  the notes, lab data/ imaging , discussion with multidisciplinary team. All questions were addressed and answered to the best of my capacity .    Thank you for allowing me to participate in the care of your patient .      Rick Kelley MD  Infectious Disease  [phone]    Individualized infection control protocols for an individual patient based on their diagnosis and risks in order to reduce risk of disease transmission followed. Coordinating with  infection prevention and control team members to enable healthcare facility staff to safely care for patient.  Managing infection prevention and treatment protocols associated with transitions of care for complex patients.  In-depth patient chart review that entails going back farther in time and assessing the complete breadth of all health care interactions, with higher-level synthesis for complex diagnoses.  Engaging in complex medical decision-making associated with antimicrobial prescribing including considerations such as antimicrobial resistance patterns, emergence of new variants/strains, recent antibiotic exposure, interactions/complications from comorbidities including concurrent infections, public health considerations to minimize development of antimicrobial resistance, and emerging and re-emerging infections.

## 2025-07-06 NOTE — PROGRESS NOTE ADULT - PROBLEM SELECTOR PLAN 5
- iron deficiency anemia, no reported bleeding from anywhere, negative stool for FOB  - iron level 13  - start on PO Iron supplementation and will need outpatient f/u  - asymptomatic

## 2025-07-06 NOTE — PROGRESS NOTE ADULT - PROBLEM SELECTOR PLAN 1
- Terminal Ileitis as suggested by CT with RLQ mesenteric LN enlargement - infectious  - tested negative for FOB   - pain improved, IVF hydration  - received one dose of Zosyn in ED, changed to Azithromycin   - stool culture neg (prelim)  - Stool PCR +EAEC, +Shigella  - c/w IV Azithromycin  - was started on Prednisone for suspected Crohns, 7/6 - Discontinue as stool PCR+ and etiology infectious  - stool for Calprotectin  - GI consult with Dr. Zhang   - ID consult with Dr. Kelley  - advance diet to low residue, low fat, no dairy

## 2025-07-06 NOTE — PROGRESS NOTE ADULT - PROBLEM SELECTOR PLAN 6
mild with giant platelets, ML related to sepsis, monitor.
mild with giant platelets likely related to sepsis, monitor.

## 2025-07-06 NOTE — DIETITIAN INITIAL EVALUATION ADULT - SIGNS/SYMPTOMS
as evidenced by abd pain, ileitis, abx/probiotic, prednisone- possible chrons, clears-low fiber diet

## 2025-07-06 NOTE — DIETITIAN INITIAL EVALUATION ADULT - PHYSCIAL ASSESSMENT
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

## 2025-07-06 NOTE — DIETITIAN INITIAL EVALUATION ADULT - ORAL INTAKE PTA/DIET HISTORY
breakfast: coffee, sweet bread  lunch: tortilla with eggs, beans and vegetables (zucchini, tomato, green beans), soup  dinner: rice with vegetables, tacos, pupusas   consumes small amounts of chicken and beef

## 2025-07-06 NOTE — PROGRESS NOTE ADULT - PROBLEM SELECTOR PLAN 7
B/L intermittent pneumatic compression device while in bed for mechanical DVT prophylaxis, in addition to full ambulation as tolerated    Used language line  for encounter, all patient questions answered

## 2025-07-07 VITALS
TEMPERATURE: 98 F | DIASTOLIC BLOOD PRESSURE: 68 MMHG | OXYGEN SATURATION: 99 % | SYSTOLIC BLOOD PRESSURE: 105 MMHG | HEART RATE: 91 BPM | RESPIRATION RATE: 18 BRPM

## 2025-07-07 LAB
ALBUMIN SERPL ELPH-MCNC: 2.8 G/DL — LOW (ref 3.3–5)
ALP SERPL-CCNC: 104 U/L — SIGNIFICANT CHANGE UP (ref 30–120)
ALT FLD-CCNC: 45 U/L — SIGNIFICANT CHANGE UP (ref 10–60)
ANION GAP SERPL CALC-SCNC: 7 MMOL/L — SIGNIFICANT CHANGE UP (ref 5–17)
AST SERPL-CCNC: 34 U/L — SIGNIFICANT CHANGE UP (ref 10–40)
BILIRUB SERPL-MCNC: 0.3 MG/DL — SIGNIFICANT CHANGE UP (ref 0.2–1.2)
BUN SERPL-MCNC: 7 MG/DL — SIGNIFICANT CHANGE UP (ref 7–23)
CALCIUM SERPL-MCNC: 8.9 MG/DL — SIGNIFICANT CHANGE UP (ref 8.4–10.5)
CHLORIDE SERPL-SCNC: 103 MMOL/L — SIGNIFICANT CHANGE UP (ref 96–108)
CO2 SERPL-SCNC: 30 MMOL/L — SIGNIFICANT CHANGE UP (ref 22–31)
CREAT SERPL-MCNC: 0.85 MG/DL — SIGNIFICANT CHANGE UP (ref 0.5–1.3)
CULTURE RESULTS: SIGNIFICANT CHANGE UP
EGFR: 90 ML/MIN/1.73M2 — SIGNIFICANT CHANGE UP
EGFR: 90 ML/MIN/1.73M2 — SIGNIFICANT CHANGE UP
GLUCOSE SERPL-MCNC: 90 MG/DL — SIGNIFICANT CHANGE UP (ref 70–99)
HCT VFR BLD CALC: 26.4 % — LOW (ref 34.5–45)
HGB BLD-MCNC: 7.6 G/DL — LOW (ref 11.5–15.5)
MCHC RBC-ENTMCNC: 18.6 PG — LOW (ref 27–34)
MCHC RBC-ENTMCNC: 28.8 G/DL — LOW (ref 32–36)
MCV RBC AUTO: 64.5 FL — LOW (ref 80–100)
NRBC # BLD AUTO: 0 K/UL — SIGNIFICANT CHANGE UP (ref 0–0)
NRBC # FLD: 0 K/UL — SIGNIFICANT CHANGE UP (ref 0–0)
NRBC BLD AUTO-RTO: 0 /100 WBCS — SIGNIFICANT CHANGE UP (ref 0–0)
PLATELET # BLD AUTO: 485 K/UL — HIGH (ref 150–400)
PMV BLD: 8.8 FL — SIGNIFICANT CHANGE UP (ref 7–13)
POTASSIUM SERPL-MCNC: 3.2 MMOL/L — LOW (ref 3.5–5.3)
POTASSIUM SERPL-SCNC: 3.2 MMOL/L — LOW (ref 3.5–5.3)
PROT SERPL-MCNC: 7.2 G/DL — SIGNIFICANT CHANGE UP (ref 6–8.3)
RBC # BLD: 4.09 M/UL — SIGNIFICANT CHANGE UP (ref 3.8–5.2)
RBC # FLD: 19.2 % — HIGH (ref 10.3–14.5)
SODIUM SERPL-SCNC: 140 MMOL/L — SIGNIFICANT CHANGE UP (ref 135–145)
SPECIMEN SOURCE: SIGNIFICANT CHANGE UP
WBC # BLD: 8.05 K/UL — SIGNIFICANT CHANGE UP (ref 3.8–10.5)
WBC # FLD AUTO: 8.05 K/UL — SIGNIFICANT CHANGE UP (ref 3.8–10.5)

## 2025-07-07 PROCEDURE — 85730 THROMBOPLASTIN TIME PARTIAL: CPT

## 2025-07-07 PROCEDURE — 81001 URINALYSIS AUTO W/SCOPE: CPT

## 2025-07-07 PROCEDURE — 96365 THER/PROPH/DIAG IV INF INIT: CPT

## 2025-07-07 PROCEDURE — 83540 ASSAY OF IRON: CPT

## 2025-07-07 PROCEDURE — 86901 BLOOD TYPING SEROLOGIC RH(D): CPT

## 2025-07-07 PROCEDURE — 36415 COLL VENOUS BLD VENIPUNCTURE: CPT

## 2025-07-07 PROCEDURE — 87186 SC STD MICRODIL/AGAR DIL: CPT

## 2025-07-07 PROCEDURE — 86850 RBC ANTIBODY SCREEN: CPT

## 2025-07-07 PROCEDURE — 85025 COMPLETE CBC W/AUTO DIFF WBC: CPT

## 2025-07-07 PROCEDURE — 83690 ASSAY OF LIPASE: CPT

## 2025-07-07 PROCEDURE — 74177 CT ABD & PELVIS W/CONTRAST: CPT

## 2025-07-07 PROCEDURE — 82728 ASSAY OF FERRITIN: CPT

## 2025-07-07 PROCEDURE — 99285 EMERGENCY DEPT VISIT HI MDM: CPT

## 2025-07-07 PROCEDURE — 83993 ASSAY FOR CALPROTECTIN FECAL: CPT

## 2025-07-07 PROCEDURE — 87045 FECES CULTURE AEROBIC BACT: CPT

## 2025-07-07 PROCEDURE — 86900 BLOOD TYPING SEROLOGIC ABO: CPT

## 2025-07-07 PROCEDURE — 80048 BASIC METABOLIC PNL TOTAL CA: CPT

## 2025-07-07 PROCEDURE — 83735 ASSAY OF MAGNESIUM: CPT

## 2025-07-07 PROCEDURE — 85027 COMPLETE CBC AUTOMATED: CPT

## 2025-07-07 PROCEDURE — 87184 SC STD DISK METHOD PER PLATE: CPT

## 2025-07-07 PROCEDURE — 87507 IADNA-DNA/RNA PROBE TQ 12-25: CPT

## 2025-07-07 PROCEDURE — 83550 IRON BINDING TEST: CPT

## 2025-07-07 PROCEDURE — 87046 STOOL CULTR AEROBIC BACT EA: CPT

## 2025-07-07 PROCEDURE — 81025 URINE PREGNANCY TEST: CPT

## 2025-07-07 PROCEDURE — 99239 HOSP IP/OBS DSCHRG MGMT >30: CPT

## 2025-07-07 PROCEDURE — 96375 TX/PRO/DX INJ NEW DRUG ADDON: CPT

## 2025-07-07 PROCEDURE — 93005 ELECTROCARDIOGRAM TRACING: CPT

## 2025-07-07 PROCEDURE — 87086 URINE CULTURE/COLONY COUNT: CPT

## 2025-07-07 PROCEDURE — 71045 X-RAY EXAM CHEST 1 VIEW: CPT

## 2025-07-07 PROCEDURE — 80053 COMPREHEN METABOLIC PANEL: CPT

## 2025-07-07 PROCEDURE — 85610 PROTHROMBIN TIME: CPT

## 2025-07-07 PROCEDURE — 87077 CULTURE AEROBIC IDENTIFY: CPT

## 2025-07-07 PROCEDURE — 82272 OCCULT BLD FECES 1-3 TESTS: CPT

## 2025-07-07 RX ORDER — FERROUS SULFATE 137(45) MG
1 TABLET, EXTENDED RELEASE ORAL
Qty: 90 | Refills: 0
Start: 2025-07-07 | End: 2025-10-04

## 2025-07-07 RX ADMIN — Medication 1 TABLET(S): at 08:19

## 2025-07-07 RX ADMIN — Medication 40 MILLIEQUIVALENT(S): at 09:25

## 2025-07-07 NOTE — CHART NOTE - NSCHARTNOTEFT_GEN_A_CORE
To Whom It May Concern:    Tanisha Silva (: 1990) was admitted to [facility] between 2025 and 2025. Please excuse her from work during this time. She may return to work without restrictions.      Sincerely,      Ronaldo Ashley M.D. To Whom It May Concern:        Tanisha Silva (: 1990) was admitted to [facility] between 2025 and 2025. Please excuse her from work during this time. She may return to work without restrictions.      Sincerely,      Ronaldo Ashley M.D. To Whom It May Concern:      Tanisha Silva (: 1990) was admitted to [facility] between 2025 and 2025. Please excuse her from work during this time. She may return to work without restrictions.          Sincerely,      Ronaldo Ashley M.D. To Whom It May Concern:      Tanisha Silva (: 1990) was admitted to [facility] between 2025 and 2025. Please excuse her from work during this time. She may return to work without restrictions.          Sincerely,        Ronaldo Ashley M.D.

## 2025-07-07 NOTE — DISCHARGE NOTE PROVIDER - NSDCPNSUBOBJ_GEN_ALL_CORE
: [id number]    Patient seen and examined at bedside. No acute events overnight. Diarrhea resolved. NO fever or chills. Tolerating PO. Discussed following up outpatient for likely CR. Patient looking forward to going home.

## 2025-07-07 NOTE — PROGRESS NOTE ADULT - ASSESSMENT
abd pain  ileitis  infectious colitis       plan  gi pcr positive for EAEC, Shigella  probiotic  cont abx  bacid 1 tab po tid  monitor stool consistency/frequency   replete electrolytes as needed   low residue diet as tolerated   colonoscopy as oupt in 4 to 6 weeks    Jeremy Fleming M.D.  [facility]  [phone]      Advanced care planning was discussed with patient and family.  Advanced care planning forms were reviewed and discussed.  Risks, benefits and alternatives of gastroenterologic procedures were discussed in detail and all questions were answered.    30 minutes spent.  
abd pain  ileitis  infectious   shigella ecoli  plan  gi pcr  probiotic  cont abx  bacid 1 tab po tid  follow up stool studies  monitor stool consistency/frequency   replete electrolytes as needed   low residue diet as tolerated   colonosocpy as oupt in 4 to 6 weeks    Advanced care planning was discussed with patient and family.  Advanced care planning forms were reviewed and discussed.  Risks, benefits and alternatives of gastroenterologic procedures were discussed in detail and all questions were answered.    30 minutes spent.  
34 y/o F with no significant PMH presented with abdominal pain, diarrhea, and a single episode of vomiting.
34 y/o F with no significant PMH presented with abdominal pain, diarrhea, and a single episode of vomiting.

## 2025-07-07 NOTE — DISCHARGE NOTE PROVIDER - NSDCCPCAREPLAN_GEN_ALL_CORE_FT
PRINCIPAL DISCHARGE DIAGNOSIS  Diagnosis: Ileitis  Assessment and Plan of Treatment: You were found to have Shigella and E. Coli. You finished 3 days of antibiotics. Please follow up with gastroenterology in 4-6 weeks to ensure the inflammation has resolved.      SECONDARY DISCHARGE DIAGNOSES  Diagnosis: Anemia  Assessment and Plan of Treatment: You appear to have iron deficiency anemia. Please follow up with gastroenterology as they may perform a colonoscopy. Also follow up with the hematologist. Take iron tabs as directed and have your blood levels monitored as outpatient

## 2025-07-07 NOTE — DISCHARGE NOTE PROVIDER - CARE PROVIDER_API CALL
02/20/25    Chief Complaint    Chief Complaint   Patient presents with    Office Visit       Referring provider: Blanca Chandler NP     History of Present Illness:    Aria Johnston is a 79 year old female presenting for initial evaluation of dry eyes and dry mouth.      Pertinent medical history:  osteopenia, IBS, GERD, PAOD, vavlular disease     Current:     Reports dry mouth and dry eyes for many years.  She had seen ENT and was given cevimeline which helped but was very expensive     She reports tooth loss in the last year     Has a cyst on the dorsal right hand     Mild stiffness in the morning.     Current treatments:         Past treatments:       Cevimeline- expensive      Current Outpatient Medications   Medication Sig    fluconazole (DIFLUCAN) 150 MG tablet Take 1 tablet by mouth 1 time for 1 dose.    pilocarpine (SALAGEN) 5 MG tablet Take 1 tablet by mouth in the morning and 1 tablet at noon and 1 tablet in the evening.     No current facility-administered medications for this visit.        ALLERGIES:   Allergen Reactions    Iodinated Contrast Media [Iodinated Diagnostic Agents] HIVES and RASH    Penicillins HIVES, DIZZINESS and Other (See Comments)     Passed out     Sulfa Antibiotics Other (See Comments)     Passed out        Past Medical History:   Diagnosis Date    IBS (irritable bowel syndrome)     Kidney stones     Mitral valve regurgitation     Osteopenia     PAOD (peripheral arterial occlusive disease) (CMD)     Situational depression     Tricuspid insufficiency        History reviewed. No pertinent surgical history.    Social History     Socioeconomic History    Marital status: /Civil Union     Spouse name: Not on file    Number of children: Not on file    Years of education: Not on file    Highest education level: Not on file   Occupational History    Not on file   Tobacco Use    Smoking status: Former     Current packs/day: 0.00     Types: Cigarettes     Quit date: 2017     Years since  quittin.1     Passive exposure: Past    Smokeless tobacco: Never   Substance and Sexual Activity    Alcohol use: Not on file    Drug use: Not on file    Sexual activity: Not on file   Other Topics Concern    Not on file   Social History Narrative    Not on file     Social Determinants of Health     Financial Resource Strain: Not on file   Food Insecurity: Not on file   Transportation Needs: Not on file   Physical Activity: Not on file   Stress: Not on file   Social Connections: Not on file   Interpersonal Safety: Not on file     History reviewed. No pertinent family history.     Review of Systems  Constitutional: negative for weight loss, fever, night sweats  Eyes: positive for and Dryness  Ears-Nose-Mouth-Throat:negative for oral ulcers, hearing loss, difficulty swallowing, jaw claudication positive for Dryness of mouth  Cardiovascular:negative for palpitations, edema   Positive for occasional chest pain in the morning   Respiratory:positive for occasional cough during the night   Gastrointestinal:negative for abdominal pain, nausea/vomiting, constipation/diarrhea, heartburn, positive for , and Irritable bowel syndrome  Genitourinary:deferred   Musculoskeletal:  positive for cyst on right hand.  Osteoarthritis   Skin: positive for dry skin, itchy eyelid   Neurological: positive for numbness/tingling in the feet   Psychiatric:positive for Anxiety  Endocrine:negative   Hematologic/Lymphatic: negative for lymphadenopathy    PHYSICAL EXAMINATION:   Vitals:    25 1257   BP: 112/70   Pulse: 75   SpO2: 97%   Weight: 66.2 kg (146 lb)   Height: 5' 2\" (1.575 m)      General: well developed and well nourished  Head: Normocephalic. No masses, lesions, tenderness or abnormalities  Eyes: conjunctiva pallor.    ENT: No oral or nasal ulcers.  Dry oral mucosa   Neck: Supple, no cervical adenopathy, and no supraclavicular adenopathy  Cardiovascular: regular rhythm, no clinically significant murmur, peripheral pulses  normal  Pulmonary: CTAB without wheezes/ rales/ rhonchi, no increased work of breathing  Abdomen: deferred  Skin: patch of faint erythema left temple, no edema, and temperature normal;   Neuro: orientated to time, place & person and mood and affect normal  Musculoskeletal   Gait and stance is normal;  Shoulders: normal active ROM, no tenderness, no impingement sign  Elbows: normal flexion and extension without pain, no tenderness to joint, no synovitis or deformity   Wrists:normal flexion, extension, adduction, abduction without pain.  no tenderness or synovitis  Hands:Heberden's nodes present and ariadna's nodes.  MCP bony hypertrophy on the right 2nd-3rd   Spine: full range of motion, no tenderness, palpable spasm or pain on motion  Hips: Normal range of motion without pain, no tenderness   Knees: bony hypertrophy R>L.  No tenderness.  Genu varus  R>L  Foot/ankle: normal flexion, dorsiflexion without pain.  no tenderness or synovitis of foot or ankle    Fibromyalgia tender points: none        Pertinent labs:    9/10/24: RF negative, BRIT 1:160 homogenous, SSA and SSB negative      Pertinent imaging:         Monitoring:  Last TB test   Last CXR   Last eye exam   Last DEXA       ASESSMENT & PLAN:     Aria is a 79 year old female with a history of kidney stones, IBS, GERD, PAOD, and valvular disease who presents for evaluation of dry eye and dry mouth.  She has noticed dry mouth for at least 4 years and notes that she has started needing teeth pulled in the last 2 years.   Dry eyes are well-managed with otc drops.      She used to take cevimeline for dry mouth but it was too expensive. She is using biotene products    She had positive BRIT 1:160 but negative SSA and SSB.     We discussed management of dry mouth and dry eyes.  We discussed possible minor salivary biopsy. We decided to treat symptoms and hold off on biopsy.    Recommend pilocarpine 5 mg tid for dry mouth.      She responded very well to diflucan x 1  last year for vaginal dryness.  She can repeat this today.     See below for additional recommendations     She has a large multilobular cyst on the right hand.  Refer to ortho    Follow up 3-6 months [place]         Sjogren syndrome with dental involvement (CMD)  (primary encounter diagnosis)  Plan: fluconazole (DIFLUCAN) 150 MG tablet,         pilocarpine (SALAGEN) 5 MG tablet, BRIT Screen         With Antibody And IFA Reflex, Complement C3         Complement C4, Complement Activity, Total    Ganglion of hand, right  Plan: SERVICE TO ORTHOPEDICS      Management of Sjogren's     EYES  Artificial Tears:  preservative-free for moderate to severe Dry Eye  Demulcents - help retain moisture and soothe mucus membranes  Refresh Relieva  Theratears   Refresh Optive   Systane   Emollients: stabilize tear film and reduce evaporation   Ointments: Refresh PM or Systane Nighttime   Drops: Systane Balance/Complete  Castor oil: Refresh Optive Advanced/ Refresh Enrique-3  Hyaluronic Acid: lubrication, thickens and stabilizes tear film, protects surface of eye from mechanical damage  Oasis Tears, Optase Hylo Relief, Blink  Prescription drops:  Cyclosporin A- targeted anti-inflammatory   Restasis: concentration 0.05% - not as effective but more covered  Cequa: concentration 0.09%- lipophilic vehicle  Veyvye: concentration 0.10%, lipophilic.  Less sting  Steroids: short term anti-inflammatory   Strong: dexamethasone, prednisolone  Weaker: Eysuvis, lotemax  Combination: Blephamide- steroid plus antibiotic  Antibiotic:   Azithromycin topical vs tablets especially for meibomitis  Other:  Tyrvaya  Pilocarpine for dry mouth can increase tear production  Cevimeline for dry mouth can increase tear production  [id number]: neurostimulation to stimulate lacrimal function     Tips:   Start with lower viscosity tears.    Higher concentrations may last longer and have more relief but are thicker and can cause blurry vision  If significant MGD-  emollients   Warm compresses 2 times daily 5-10 min (preserves oils on eye)  Lid hygiene   Dilute baby shampoo  Commercial eyelid cleanser - OcuSoft, Systane lid wipes   Screen use: we do not blink as often and also we have incomplete blinks - leads to increased evaporation of tears from surface of eyes   Changes in tear film composition from screen use   20-20-20 rule - look at something 20 feet away for 20 seconds every 20 min  Blink frequently and take breaks   Use artifical tears frequently while working   Adjust height of screen so you are looking slightly down (less eye exposed while working)  Humidity: humidifiers in winter in bedroom   Environment: block wind, heat, AC  Wraparound sunglasses  Dry eye sunglasses  7eye-$$  Familytic Climate control - $$  Onion goggles - cheaper on Amazon- some < $10   Dry eye glasses/moisture chamber glasses  Dry eye goggles- can be very helpful- about $ 20  Divert air vents from eyes  Omega-3 fatty acids: possibly beneficial   High quality- nordic naturals.  If too $, sprinkle flax seed in yogurt    Oral:   Oral prescriptions to stimulate salivary flow- pilocarpine vs civemeline   Humidifier at night  1.1% Fluoride dentifrice   Xylimelts at night for salivary flow  Nystatin for tongue   Oral 0.12% chlorhexidine rinse x 1 week  Sleep strip/lip tape   Artificial saliva   Nutritionist/dietician     Risk of Lymphoma:   Both primary and secondary Sjogren's Syndrome patients are at higher risk of lymphoma than are healthy individuals and patients with other autoimmune diseases. Patients with Sjogren syndrome have an increased risk of non-Hodgkin lymphoma compared with the general population, a finding shared by diverse autoimmune disorders such as rheumatoid arthritis, SLE, and celiac disease, but which is highest for Sjogren syndrome. This increased risk is evident for both primary and secondary forms of Sjogren syndrome. Estimates of the lifetime risk of non-Hodgkin lymphoma range  from approximately 5 to 10 percent which is 5 to 44 times higher than that of the normal population.  Patient was advised to do self lymph node examination at least every month.     Lymphomas developing in Sjogren Syndrome may occur in the salivary glands, gastrointestinal tract or lungs. They often begin as B cell MALT lymphomas or in lymph nodes, as marginal zone lymphomas. After years of slow progression, these indolent tumors can progress to rapidly growing, high grade, large B cell lymphomas.     Various studies have identified risk factors for lymphoma development. Persistent salivary gland enlargement is the most important clinical risk factor. Others include cutaneous vasculitis, lymphadenopathy, splenomegaly, cryoglobulinemia, hypocomplementemia, extremely low C4 levels, IgM kappa monoclonal protein, neutropenia.and the development of glomerulonephritis. Additional risk factors for lymphoma development include the onset of Sjogren Syndrome at a young age and prolonged salivary gland enlargement. The presence of a high focus score and possibly germinal center-like structures on labial salivary gland biopsies, performed at the time of initial patient evaluation, are both predictive of lymphoma and provide an additional rationale, other than diagnostic, for the labial gland biopsy.        Discussed other important areas of health related to rheumatological conditions including cardiovascular, bone health, and recommended preventative screenings.  Discussed importance of weight management, regular physical activity - including weight bearing activities-, blood pressure and cholesterol management with primary care.      I reviewed the side effects of all medications prescribed by me as listed in the physician's desk reference and in the package inserts.  Other reasonable and generally accepted treatment options, including the option to do nothing at all, were discussed. The patient was instructed by me to  contact our office if the symptoms have not improved, worsened, or if there are any issues/concerns.                Sarbjit Zhang  Gastroenterology  [address] [place] [address]  Phone: [phone]  Fax: [phone]  Follow Up Time: Routine    Christopher Carver  Medical Oncology  [address] [place] [address], [address]  [place] [address]  Phone: [phone]  Fax: [phone]  Follow Up Time: Routine

## 2025-07-07 NOTE — DISCHARGE NOTE PROVIDER - HOSPITAL COURSE
HPI:  This is a 34 y/o F with no significant PMH who presented with diffuse crampy abdominal pain, mostly around the umbilicus & RLQ, started 8 days ago, and watery non bloody diarrhea that started the following day, about 5 times a day, last was yesterday am, also reports having a single episode of vomiting 2 days ago, vomitus was unremarkable, no fever or chills, no household with similar symptoms, denies any deli or undercooked/ raw meat consumption, no H/O of similar episodes in the past, and no FH of IBD. (04 Jul 2025 22:55)    Hospital Course:  34 y/o F with no significant PMH presented with abdominal pain, diarrhea, and a single episode of vomiting. Patient GI PCR was positive for Shigella and EAEC. She was started on Azithromycin for 3 days which completely resolved her diarrhea. She was seen by GI and ID. Also with noted microcytic anemia which is likely iron deficiency. She will follow up with GI and heme outpatient. We started oral iron supplementation.

## 2025-07-07 NOTE — CARE COORDINATION ASSESSMENT. - NSCAREPROVIDERS_GEN_ALL_CORE_FT
CARE PROVIDERS:  Accepting Physician: Mannie Mederos  Administration: Damion Jules  Administration: Ronaldo Ashley  Admitting: Mannie Mederos  Attending: Mannie Mederos  Consultant: Héctor Emerson  Consultant: Jeremy Fleming  Consultant: Sarbjit Zhang  Consultant: Rick Kelley  ED ACP: Roxana Jennings ED Attending: Shayla Agosto  ED Nurse: Chelsea Koenig  Nurse: Yeni Pascual  Nurse: Flores Zimmerman  Nurse: Getachew Ulrich  Ordered: Physician, Ordering  PCA/Nursing Assistant: Abel Mane  Primary Team: Jaron Durham  Primary Team: Nishant Gomez  : Esla Coronado  Team: BASILIO  Hospitalists, Team  UR// Supp. Assoc.: Angeles Hamlin

## 2025-07-07 NOTE — CARE COORDINATION ASSESSMENT. - NSDCPLANSERVICES_GEN_ALL_CORE
36YO  F with no significant PMH who presented with diffuse crampy abdominal pain, mostly around the umbilicus & RLQ, started 8 days ago, and watery non bloody diarrhea that started the following day, about 5 times a day admitted with infectious gastroenteritis CT AP showed ileitis. CM met with patient and her  Juan at bedside. [facility] #[id number] utilized for . Patient is agreeable to discuss transition of care planning with her  present. CM introduced her self and role during hospital stay and assistance with dc planning. Patient and her  stated understanding. Patient is alert and oriented x4, states she feel much better and anticipating discharge home today. Patient resides in a private house with her  and is ind in adls. Patient is uninsured at this time, patient was referred to the medicaid/ financial office and will be seen today by one of the financial councilors. Patient states she does go tot the [facility] when she needs medical care. No needs for discharge identified at this time. Patients  is at bedside and will transport patient home later today. Patient is agreeable with discharge plan.   pharmacy [facility] stations [phone]/No Anticipated Discharge Needs

## 2025-07-07 NOTE — PROGRESS NOTE ADULT - SUBJECTIVE AND OBJECTIVE BOX
INTERVAL HPI/OVERNIGHT EVENTS:  HPI:  This is a 36 y/o F with no significant PMH who presented with diffuse crampy abdominal pain, mostly around the umbilicus & RLQ, started 8 days ago, and watery non bloody diarrhea that started the following day, about 5 times a day, last was yesterday am, also reports having a single episode of vomiting 2 days ago, vomitus was unremarkable, no fever or chills, no household with similar symptoms, denies any deli or undercooked/ raw meat consumption, no H/O of similar episodes in the past, and no FH of IBD. (04 Jul 2025 22:55)    MEDICATIONS  (STANDING):  azithromycin  IVPB 500 milliGRAM(s) IV Intermittent every 24 hours  azithromycin  IVPB      lactated ringers. 1000 milliLiter(s) (100 mL/Hr) IV Continuous <Continuous>  lactobacillus acidophilus 1 Tablet(s) Oral four times a day with meals    MEDICATIONS  (PRN):  acetaminophen     Tablet .. 650 milliGRAM(s) Oral every 6 hours PRN Temp greater or equal to 38C (100.4F), Mild Pain (1 - 3)      Allergies    No Known Allergies    Intolerances          General:  No wt loss, fevers, chills, night sweats, fatigue,   Eyes:  Good vision, no reported pain  ENT:  No sore throat, pain, runny nose, dysphagia  CV:  No pain, palpitations, hypo/hypertension  Resp:  No dyspnea, cough, tachypnea, wheezing  GI:  No pain, No nausea, No vomiting, No diarrhea, No constipation, No weight loss, No fever, No pruritis, No rectal bleeding, No tarry stools, No dysphagia,  :  No pain, bleeding, incontinence, nocturia  Muscle:  No pain, weakness  Neuro:  No weakness, tingling, memory problems  Psych:  No fatigue, insomnia, mood problems, depression  Endocrine:  No polyuria, polydipsia, cold/heat intolerance  Heme:  No petechiae, ecchymosis, easy bruisability  Skin:  No rash, tattoos, scars, edema      PHYSICAL EXAM:   Vital Signs:  Vital Signs Last 24 Hrs  T(C): 36.4 (06 Jul 2025 04:51), Max: 36.6 (05 Jul 2025 16:14)  T(F): 97.6 (06 Jul 2025 04:51), Max: 97.9 (05 Jul 2025 16:14)  HR: 73 (06 Jul 2025 04:51) (73 - 96)  BP: 93/55 (06 Jul 2025 04:51) (93/55 - 101/65)  BP(mean): --  RR: 18 (06 Jul 2025 04:51) (18 - 18)  SpO2: 97% (06 Jul 2025 04:51) (97% - 100%)    Parameters below as of 06 Jul 2025 04:51  Patient On (Oxygen Delivery Method): room air      Daily     Daily I&O's Summary    05 Jul 2025 07:01  -  06 Jul 2025 07:00  --------------------------------------------------------  IN: 2420 mL / OUT: 0 mL / NET: 2420 mL        GENERAL:  Appears stated age, well-groomed, well-nourished, no distress  HEENT:  NC/AT,  conjunctivae clear and pink, no thyromegaly, nodules, adenopathy, no JVD, sclera -anicteric  CHEST:  Full & symmetric excursion, no increased effort, breath sounds clear  HEART:  Regular rhythm, S1, S2, no murmur/rub/S3/S4, no abdominal bruit, no edema  ABDOMEN:  Soft, non-tender, non-distended, normoactive bowel sounds,  no masses ,no hepato-splenomegaly, no signs of chronic liver disease  EXTEREMITIES:  no cyanosis,clubbing or edema  SKIN:  No rash/erythema/ecchymoses/petechiae/wounds/abscess/warm/dry  NEURO:  Alert, oriented, no asterixis, no tremor, no encephalopathy      LABS:                        7.8    7.78  )-----------( 477      ( 06 Jul 2025 06:00 )             27.0     07-06    140  |  105  |  6[L]  ----------------------------<  140[H]  3.9   |  25  |  0.75    Ca    8.9      06 Jul 2025 06:00  Mg     2.3     07-05    TPro  7.3  /  Alb  2.8[L]  /  TBili  0.2  /  DBili  x   /  AST  24  /  ALT  38  /  AlkPhos  118  07-06    PT/INR - ( 04 Jul 2025 19:10 )   PT: 13.5 sec;   INR: 1.17 ratio         PTT - ( 04 Jul 2025 19:10 )  PTT:24.1 sec  Urinalysis Basic - ( 06 Jul 2025 06:00 )    Color: x / Appearance: x / SG: x / pH: x  Gluc: 140 mg/dL / Ketone: x  / Bili: x / Urobili: x   Blood: x / Protein: x / Nitrite: x   Leuk Esterase: x / RBC: x / WBC x   Sq Epi: x / Non Sq Epi: x / Bacteria: x      amylase   lipaseLipase: 183 U/L (07-05 @ 06:00)  Lipase: 197 U/L (07-04 @ 18:41)    RADIOLOGY & ADDITIONAL TESTS:  
[facility]  Steve Mejias PA-C  [address] [place] [address]  [phone]      INTERVAL HPI/OVERNIGHT EVENTS: no acute events    MEDICATIONS  (STANDING):  ferrous    sulfate 325 milliGRAM(s) Oral daily  lactobacillus acidophilus 1 Tablet(s) Oral four times a day with meals    MEDICATIONS  (PRN):  acetaminophen     Tablet .. 650 milliGRAM(s) Oral every 6 hours PRN Temp greater or equal to 38C (100.4F), Mild Pain (1 - 3)      Allergies    No Known Allergies    Intolerances        ROS:   General:  No  fevers, chills, night sweats, fatigue,   Eyes:  Good vision, no reported pain  ENT:  No sore throat, pain, runny nose, dysphagia  CV:  No pain, palpitations, hypo/hypertension  Resp:  No dyspnea, cough, tachypnea, wheezing  GI:  No pain, No nausea, No vomiting, No diarrhea, No constipation, No weight loss, No fever, No pruritis, No rectal bleeding, No tarry stools, No dysphagia,  :  No pain, bleeding, incontinence, nocturia  Muscle:  No pain, weakness  Neuro:  No weakness, tingling, memory problems  Psych:  No fatigue, insomnia, mood problems, depression  Endocrine:  No polyuria, polydipsia, cold/heat intolerance  Heme:  No petechiae, ecchymosis, easy bruisability  Skin:  No rash, tattoos, scars, edema      PHYSICAL EXAM:   Vital Signs:  Vital Signs Last 24 Hrs  T(C): 36.8 (07 Jul 2025 05:08), Max: 36.8 (07 Jul 2025 05:08)  T(F): 98.2 (07 Jul 2025 05:08), Max: 98.2 (07 Jul 2025 05:08)  HR: 82 (07 Jul 2025 05:08) (75 - 86)  BP: 106/68 (07 Jul 2025 05:08) (104/70 - 106/68)  BP(mean): --  RR: 18 (07 Jul 2025 05:08) (18 - 18)  SpO2: 98% (07 Jul 2025 05:08) (97% - 99%)    Parameters below as of 07 Jul 2025 05:08  Patient On (Oxygen Delivery Method): room air      Daily     Daily     GENERAL:  Appears stated age,   HEENT:  NC/AT,    CHEST:  Full & symmetric excursion,   HEART:  Regular rhythm,  ABDOMEN:  Soft, non-tender, non-distended,  EXTEREMITIES:  no cyanosis  SKIN:  No rash  NEURO:  Alert,       LABS:                        7.6    8.05  )-----------( 485      ( 07 Jul 2025 08:04 )             26.4     07-07    140  |  103  |  7   ----------------------------<  90  3.2[L]   |  30  |  0.85    Ca    8.9      07 Jul 2025 08:04    TPro  7.2  /  Alb  2.8[L]  /  TBili  0.3  /  DBili  x   /  AST  34  /  ALT  45  /  AlkPhos  104  07-07      Urinalysis Basic - ( 07 Jul 2025 08:04 )    Color: x / Appearance: x / SG: x / pH: x  Gluc: 90 mg/dL / Ketone: x  / Bili: x / Urobili: x   Blood: x / Protein: x / Nitrite: x   Leuk Esterase: x / RBC: x / WBC x   Sq Epi: x / Non Sq Epi: x / Bacteria: x        RADIOLOGY & ADDITIONAL TESTS:  
INTERVAL HPI/OVERNIGHT EVENTS:   Used translation line for Occitan interpretation.  Patient seen and examined.  Has right sided abd pain, but decreased vs. yesterday.  No diarrhea since 2 days ago, had normal this morning.    REVIEW OF SYSTEMS:  See HPI,  all others negative    PHYSICAL EXAM:  Vital Signs Last 24 Hrs  T(C): 36.7 (2025 07:27), Max: 37.1 (2025 17:58)  T(F): 98 (2025 07:27), Max: 98.8 (2025 17:58)  HR: 95 (2025 10:51) (76 - 105)  BP: 100/64 (2025 10:51) (90/56 - 116/82)  BP(mean): 74 (2025 10:51) (74 - 93)  RR: 16 (2025 10:51) (16 - 24)  SpO2: 100% (2025 10:51) (98% - 100%)    Parameters below as of 2025 07:27  Patient On (Oxygen Delivery Method): room air    GENERAL: NAD, well-groomed, well-developed, awake, alert, oriented x 3, fluent and coherent speech  EYES: EOMI, PERRLA, conjunctiva and sclera clear  ENMT: No tonsillar erythema, exudates, or enlargement; Moist mucous membranes, No lesions seen on oral mucosa  NECK: Supple, No JVD, No Cervical LAD, No thyromegaly, No thyroid nodules felt  NERVOUS SYSTEM:  Good concentration; Moving all 4 extremities against gravity and resistance; No gross sensory deficits, No facial droop  CHEST WALL: No masses  CHEST/LUNG: Clear to auscultation bilaterally with good air entry; No rales, rhonchi, wheezing, or rubs  HEART: Regular rate and rhythm; No murmurs, rubs, or gallops  ABDOMEN: Soft, +right sided tenderness, no tenderness on left side, no rebound/rigitidy  EXTREMITIES:  2+ Peripheral Pulses, No clubbing, cyanosis, or edema, no calf tenderness in either leg    Diagnostic Testin.3    10.62 )-----------( 439      ( 2025 06:00 )             28.6     2025 06:00    140    |  106    |  8      ----------------------------<  140    4.8     |  22     |  0.80     Ca    8.9        2025 06:00  Mg     2.3       2025 06:00    TPro  8.1    /  Alb  3.2    /  TBili  0.2    /  DBili  x      /  AST  29     /  ALT  35     /  AlkPhos  137    2025 18:41    PT/INR - ( 2025 19:10 )   PT: 13.5 sec;   INR: 1.17 ratio         PTT - ( 2025 19:10 )  PTT:24.1 sec  Urinalysis Basic - ( 2025 06:00 )    Color: x / Appearance: x / SG: x / pH: x  Gluc: 140 mg/dL / Ketone: x  / Bili: x / Urobili: x   Blood: x / Protein: x / Nitrite: x   Leuk Esterase: x / RBC: x / WBC x   Sq Epi: x / Non Sq Epi: x / Bacteria: x           
INTERVAL HPI/OVERNIGHT EVENTS:   Used translation line for German interpretation.  Patient seen and examined.  Right sided abd pain minimal today.  No diarrhea since 3 days ago, had normal BM yesterday.    REVIEW OF SYSTEMS:  See HPI,  all others negative    PHYSICAL EXAM:  Vital Signs Last 24 Hrs  T(C): 36.4 (2025 04:51), Max: 36.6 (2025 16:14)  T(F): 97.6 (2025 04:51), Max: 97.9 (2025 16:14)  HR: 73 (2025 04:51) (73 - 98)  BP: 93/55 (2025 04:51) (93/55 - 116/82)  BP(mean): 74 (2025 10:51) (74 - 93)  RR: 18 (2025 04:51) (14 - 18)  SpO2: 97% (2025 04:51) (97% - 100%)    Parameters below as of 2025 04:51  Patient On (Oxygen Delivery Method): room air    GENERAL: NAD, well-groomed, well-developed, awake, alert, oriented x 3, fluent and coherent speech  EYES: EOMI, PERRLA, conjunctiva and sclera clear  NERVOUS SYSTEM:  Moving all 4 extremities against gravity and resistance; No gross sensory deficits, No facial droop  CHEST/LUNG: Clear to auscultation bilaterally with good air entry; No rales, rhonchi, wheezing, or rubs  HEART: Regular rate and rhythm; No murmurs, rubs, or gallops  ABDOMEN: Soft, no tenderness, no rebound/rigidity, +BS x 4 quadrants  EXTREMITIES:  2+ Peripheral Pulses, No clubbing, cyanosis, or edema, no calf tenderness in either leg    Diagnostic Testin.8    7.78  )-----------( 477      ( 2025 06:00 )             27.0     07-06    140  |  105  |  6[L]  ----------------------------<  140[H]  3.9   |  25  |  0.75    Ca    8.9      2025 06:00  Mg     2.3     07-05    TPro  7.3  /  Alb  2.8[L]  /  TBili  0.2  /  DBili  x   /  AST  24  /  ALT  38  /  AlkPhos  118

## 2025-07-07 NOTE — DISCHARGE NOTE PROVIDER - ATTENDING DISCHARGE PHYSICAL EXAMINATION:
T(C): 36.8 (07-07-25 @ 05:08), Max: 36.8 (07-07-25 @ 05:08)  HR: 82 (07-07-25 @ 05:08) (75 - 86)  BP: 106/68 (07-07-25 @ 05:08) (104/70 - 106/68)  RR: 18 (07-07-25 @ 05:08) (18 - 18)  SpO2: 98% (07-07-25 @ 05:08) (97% - 99%)    GEN: female in NAD, appears comfortable, no diaphoresis  EYES: No scleral injection, EOMI  ENTM: neck supple & symmetric without tracheal deviation, moist membranes, no gross hearing impairment  CV: +S1/S2, no m/r/g, no abdominal bruit, no LE edema  RESP: breathing comfortably, no respiratory accessory muscle use, CTAB, no w/r/r  GI: normoactive BS, soft, NTND, no rebounding/guarding, no palpable masses  LYMPHATICS: no LAD or tenderness to palpation  NEURO: AOx3, no focal deficits, CNII-XII grossly intact  PSYCH: No SI/HI/AVH, appropriate affect, appropriate insight/judgment   SKIN: no petechiae, ecchymosis or maculopapular rash noted

## 2025-07-07 NOTE — DISCHARGE NOTE PROVIDER - PROVIDER TOKENS
PROVIDER:[TOKEN:[[id number]],FOLLOWUP:[Routine]],PROVIDER:[TOKEN:[[id number]],FOLLOWUP:[Routine]]

## 2025-07-07 NOTE — DISCHARGE NOTE NURSING/CASE MANAGEMENT/SOCIAL WORK - FINANCIAL ASSISTANCE
[facility] provides services at a reduced cost to those who are determined to be eligible through [facility]’s financial assistance program. Information regarding [facility]’s financial assistance program can be found by going to [email or web address] or by calling [phone].

## 2025-07-07 NOTE — DISCHARGE NOTE NURSING/CASE MANAGEMENT/SOCIAL WORK - PATIENT PORTAL LINK FT
You can access the FollowMyHealth Patient Portal offered by [facility] by registering at the following website: [email or web address]. By joining [facility] [employer]’s FollowMyHealth portal, you will also be able to view your health information using other applications (apps) compatible with our system.

## 2025-07-08 LAB
-  AMPICILLIN: SIGNIFICANT CHANGE UP
-  AZITHROMYCIN: SIGNIFICANT CHANGE UP
-  CIPROFLOXACIN: SIGNIFICANT CHANGE UP
-  LEVOFLOXACIN: SIGNIFICANT CHANGE UP
-  TRIMETHOPRIM/SULFAMETHOXAZOLE: SIGNIFICANT CHANGE UP
CALPROTECTIN STL-MCNT: 1720 UG/G — HIGH (ref 0–120)
CULTURE RESULTS: ABNORMAL
METHOD TYPE: SIGNIFICANT CHANGE UP
METHOD TYPE: SIGNIFICANT CHANGE UP
ORGANISM # SPEC MICROSCOPIC CNT: ABNORMAL
SPECIMEN SOURCE: SIGNIFICANT CHANGE UP

## 2025-07-26 RX ADMIN — SODIUM CHLORIDE 150 MILLILITER(S): 9 INJECTION, SOLUTION INTRAVENOUS at 22:51
